# Patient Record
Sex: MALE | Race: WHITE | NOT HISPANIC OR LATINO | Employment: OTHER | URBAN - NONMETROPOLITAN AREA
[De-identification: names, ages, dates, MRNs, and addresses within clinical notes are randomized per-mention and may not be internally consistent; named-entity substitution may affect disease eponyms.]

---

## 2017-06-29 ENCOUNTER — HOSPITAL ENCOUNTER (EMERGENCY)
Facility: HOSPITAL | Age: 67
Discharge: HOME | End: 2017-06-29
Attending: EMERGENCY MEDICINE | Admitting: EMERGENCY MEDICINE

## 2017-06-29 VITALS
WEIGHT: 185 LBS | RESPIRATION RATE: 18 BRPM | SYSTOLIC BLOOD PRESSURE: 117 MMHG | OXYGEN SATURATION: 99 % | DIASTOLIC BLOOD PRESSURE: 69 MMHG | BODY MASS INDEX: 25.06 KG/M2 | HEIGHT: 72 IN | TEMPERATURE: 98.7 F | HEART RATE: 69 BPM
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
## 2017-06-29 PROCEDURE — 99282 EMERGENCY DEPT VISIT SF MDM: CPT

## 2017-06-29 RX ORDER — AMOXICILLIN AND CLAVULANATE POTASSIUM 875; 125 MG/1; MG/1
1 TABLET, FILM COATED ORAL 2 TIMES DAILY
Qty: 20 TABLET | Refills: 0 | Status: SHIPPED | OUTPATIENT
Start: 2017-06-29 | End: 2017-07-09

## 2017-06-29 RX ORDER — ACETAMINOPHEN AND CODEINE PHOSPHATE 300; 30 MG/1; MG/1
1 TABLET ORAL EVERY 6 HOURS PRN
Qty: 12 TABLET | Refills: 0 | Status: SHIPPED | OUTPATIENT
Start: 2017-06-29 | End: 2018-01-04

## 2017-12-14 DIAGNOSIS — Z12.11 SPECIAL SCREENING FOR MALIGNANT NEOPLASMS, COLON: Primary | ICD-10-CM

## 2017-12-18 DIAGNOSIS — Z12.11 SPECIAL SCREENING FOR MALIGNANT NEOPLASMS, COLON: Primary | ICD-10-CM

## 2018-01-05 ENCOUNTER — ANESTHESIA (OUTPATIENT)
Dept: PERIOP | Facility: HOSPITAL | Age: 68
End: 2018-01-05

## 2018-01-05 ENCOUNTER — ANESTHESIA EVENT (OUTPATIENT)
Dept: PERIOP | Facility: HOSPITAL | Age: 68
End: 2018-01-05

## 2018-01-05 ENCOUNTER — HOSPITAL ENCOUNTER (OUTPATIENT)
Facility: HOSPITAL | Age: 68
Setting detail: HOSPITAL OUTPATIENT SURGERY
Discharge: HOME | End: 2018-01-05
Attending: INTERNAL MEDICINE | Admitting: INTERNAL MEDICINE

## 2018-01-05 VITALS
SYSTOLIC BLOOD PRESSURE: 108 MMHG | RESPIRATION RATE: 20 BRPM | WEIGHT: 190 LBS | OXYGEN SATURATION: 99 % | TEMPERATURE: 97.5 F | HEART RATE: 72 BPM | HEIGHT: 72 IN | BODY MASS INDEX: 25.73 KG/M2 | DIASTOLIC BLOOD PRESSURE: 86 MMHG

## 2018-01-05 DIAGNOSIS — Z12.11 SPECIAL SCREENING FOR MALIGNANT NEOPLASMS, COLON: ICD-10-CM

## 2018-01-05 PROCEDURE — 45385 COLONOSCOPY W/LESION REMOVAL: CPT | Performed by: INTERNAL MEDICINE

## 2018-01-05 PROCEDURE — 88305 TISSUE EXAM BY PATHOLOGIST: CPT | Performed by: INTERNAL MEDICINE

## 2018-01-05 PROCEDURE — 25010000002 PROPOFOL 1000 MG/ML EMULSION: Performed by: NURSE ANESTHETIST, CERTIFIED REGISTERED

## 2018-01-05 PROCEDURE — 25010000002 PROPOFOL 10 MG/ML EMULSION: Performed by: NURSE ANESTHETIST, CERTIFIED REGISTERED

## 2018-01-05 PROCEDURE — 25010000002 MIDAZOLAM PER 1 MG: Performed by: NURSE ANESTHETIST, CERTIFIED REGISTERED

## 2018-01-05 PROCEDURE — 25010000002 FENTANYL CITRATE (PF) 100 MCG/2ML SOLUTION: Performed by: NURSE ANESTHETIST, CERTIFIED REGISTERED

## 2018-01-05 RX ORDER — MIDAZOLAM HYDROCHLORIDE 1 MG/ML
INJECTION INTRAMUSCULAR; INTRAVENOUS AS NEEDED
Status: DISCONTINUED | OUTPATIENT
Start: 2018-01-05 | End: 2018-01-05 | Stop reason: SURG

## 2018-01-05 RX ORDER — IPRATROPIUM BROMIDE AND ALBUTEROL SULFATE 2.5; .5 MG/3ML; MG/3ML
3 SOLUTION RESPIRATORY (INHALATION) ONCE AS NEEDED
Status: DISCONTINUED | OUTPATIENT
Start: 2018-01-05 | End: 2018-01-05 | Stop reason: HOSPADM

## 2018-01-05 RX ORDER — PROPOFOL 10 MG/ML
VIAL (ML) INTRAVENOUS AS NEEDED
Status: DISCONTINUED | OUTPATIENT
Start: 2018-01-05 | End: 2018-01-05 | Stop reason: SURG

## 2018-01-05 RX ORDER — ONDANSETRON 2 MG/ML
4 INJECTION INTRAMUSCULAR; INTRAVENOUS ONCE AS NEEDED
Status: DISCONTINUED | OUTPATIENT
Start: 2018-01-05 | End: 2018-01-05 | Stop reason: HOSPADM

## 2018-01-05 RX ORDER — SIMVASTATIN 10 MG
10 TABLET ORAL NIGHTLY
COMMUNITY
End: 2021-05-03 | Stop reason: SDUPTHER

## 2018-01-05 RX ORDER — FLUOXETINE HYDROCHLORIDE 20 MG/1
20 CAPSULE ORAL DAILY
COMMUNITY

## 2018-01-05 RX ORDER — FENTANYL CITRATE 50 UG/ML
INJECTION, SOLUTION INTRAMUSCULAR; INTRAVENOUS AS NEEDED
Status: DISCONTINUED | OUTPATIENT
Start: 2018-01-05 | End: 2018-01-05 | Stop reason: SURG

## 2018-01-05 RX ORDER — CLOPIDOGREL BISULFATE 75 MG/1
75 TABLET ORAL DAILY
COMMUNITY
End: 2019-11-06

## 2018-01-05 RX ORDER — SODIUM CHLORIDE 0.9 % (FLUSH) 0.9 %
1-10 SYRINGE (ML) INJECTION AS NEEDED
Status: DISCONTINUED | OUTPATIENT
Start: 2018-01-05 | End: 2018-01-05 | Stop reason: HOSPADM

## 2018-01-05 RX ORDER — SODIUM CHLORIDE, SODIUM LACTATE, POTASSIUM CHLORIDE, CALCIUM CHLORIDE 600; 310; 30; 20 MG/100ML; MG/100ML; MG/100ML; MG/100ML
125 INJECTION, SOLUTION INTRAVENOUS CONTINUOUS
Status: DISCONTINUED | OUTPATIENT
Start: 2018-01-05 | End: 2018-01-05 | Stop reason: HOSPADM

## 2018-01-05 RX ADMIN — MIDAZOLAM HYDROCHLORIDE 2 MG: 1 INJECTION, SOLUTION INTRAMUSCULAR; INTRAVENOUS at 11:44

## 2018-01-05 RX ADMIN — SODIUM CHLORIDE, POTASSIUM CHLORIDE, SODIUM LACTATE AND CALCIUM CHLORIDE: 600; 310; 30; 20 INJECTION, SOLUTION INTRAVENOUS at 11:44

## 2018-01-05 RX ADMIN — FENTANYL CITRATE 100 MCG: 50 INJECTION INTRAMUSCULAR; INTRAVENOUS at 11:44

## 2018-01-05 RX ADMIN — PROPOFOL 120 MCG/KG/MIN: 10 INJECTION, EMULSION INTRAVENOUS at 11:47

## 2018-01-05 RX ADMIN — PROPOFOL 80 MG: 10 INJECTION, EMULSION INTRAVENOUS at 11:47

## 2018-01-05 NOTE — PLAN OF CARE
Problem: GI Endoscopy (Adult)  Goal: Signs and Symptoms of Listed Potential Problems Will be Absent or Manageable (GI Endoscopy)  Outcome: Ongoing (interventions implemented as appropriate)   01/05/18 1150   GI Endoscopy   Problems Assessed (GI Endoscopy) all   Problems Present (GI Endoscopy) none

## 2018-01-05 NOTE — H&P
"History and physical examination  January 5, 2018    HPI  67-year-old white male presents for screening colonoscopy.  No prior colonoscopy.  Family history negative for colon cancer.      Review of Systems   Negative and noncontributory.    ACTIVE PROBLEMS:   Specialty Problems     None          PAST MEDICAL HISTORY:  Past Medical History:   Diagnosis Date   • Cancer    • Hypertension    • Stroke        SURGICAL HISTORY:  Past Surgical History:   Procedure Laterality Date   • SHOULDER ROTATOR CUFF REPAIR Left        FAMILY HISTORY:  History reviewed. No pertinent family history.    SOCIAL HISTORY:  Social History   Substance Use Topics   • Smoking status: Former Smoker     Packs/day: 1.00     Years: 40.00   • Smokeless tobacco: Never Used   • Alcohol use No       CURRENT MEDICATION:    Current Facility-Administered Medications:   •  lactated ringers infusion, 125 mL/hr, Intravenous, Continuous, Chava Hickman MD  •  sodium chloride 0.9 % flush 1-10 mL, 1-10 mL, Intravenous, PRN, Chava Hickman MD     I have reviewed his list of current medications.    ALLERGIES:  Review of patient's allergies indicates no known allergies.    VISIT VITALS:  /79 (BP Location: Right arm, Patient Position: Sitting)  Pulse 65  Temp 97.4 °F (36.3 °C) (Oral)   Resp 20  Ht 182.9 cm (72\")  Wt 86.2 kg (190 lb)  SpO2 99%  BMI 25.77 kg/m2    PHYSICAL EXAMINATION:  Physical Exam   Alert, oriented ×3  HEENT: Normal  Neck: No mass  Chest: Clear  Heart: Regular rhythm  Abdomen: Soft, nontender, active BS  Extremities: No edema  Neuro: No focal deficit    Assessment/Plan   Colon cancer screening    REC  Screening colonoscopy is planned.  Procedure, benefits, risks and alternatives were explained to the patient.         Neeraj Monahan III, MD  "

## 2018-01-05 NOTE — ANESTHESIA POSTPROCEDURE EVALUATION
Patient: Jc Graves    Procedure Summary     Date Anesthesia Start Anesthesia Stop Room / Location    01/05/18 1144 1206 [facility] / [facility]       Procedure Diagnosis Surgeon Provider    COLONOSCOPY FOR SCREENING (N/A ) Special screening for malignant neoplasms, colon  (Special screening for malignant neoplasms, colon [Z12.11]) MD Chava Childers III, MD          Anesthesia Type: general  Last vitals  BP   (!) 79/48 (01/05/18 1212)   Temp   97.5 °F (36.4 °C) (01/05/18 1207)   Pulse   67 (01/05/18 1212)   Resp   20 (01/05/18 1212)     SpO2   99 % (01/05/18 1212)     Post Anesthesia Care and Evaluation    Patient location during evaluation: PHASE II  Patient participation: complete - patient participated  Level of consciousness: awake and alert  Pain score: 1  Pain management: adequate  Airway patency: patent  Anesthetic complications: No anesthetic complications  PONV Status: controlled  Cardiovascular status: acceptable  Respiratory status: acceptable  Hydration status: acceptable

## 2018-01-05 NOTE — PLAN OF CARE
Problem: GI Endoscopy (Adult)  Goal: Signs and Symptoms of Listed Potential Problems Will be Absent or Manageable (GI Endoscopy)  Outcome: Ongoing (interventions implemented as appropriate)   01/05/18 1100   GI Endoscopy   Problems Assessed (GI Endoscopy) all   Problems Present (GI Endoscopy) none

## 2018-01-05 NOTE — ANESTHESIA PREPROCEDURE EVALUATION
Anesthesia Evaluation     Patient summary reviewed and Nursing notes reviewed   no history of anesthetic complications:  NPO Solid Status: > 8 hours  NPO Liquid Status: > 8 hours     Airway   Mallampati: II  TM distance: >3 FB  Neck ROM: full  no difficulty expected  Dental - normal exam   (+) edentulous, lower dentures and upper dentures    Pulmonary - normal exam   (+) a smoker Former, COPD,   (-) asthma  Cardiovascular - normal exam  Exercise tolerance: good (4-7 METS)    NYHA Classification: II  PT is on anticoagulation therapy    (+) hypertension,   (-) past MI, dysrhythmias, angina, CHF, hyperlipidemia      Neuro/Psych  (+) CVA (2011), psychiatric history Depression,     (-) seizures  GI/Hepatic/Renal/Endo - negative ROS   (-) GERD, liver disease, no renal disease, diabetes, hypothyroidism    Musculoskeletal (-) negative ROS    Abdominal  - normal exam    Bowel sounds: normal.   Substance History - negative use     OB/GYN negative ob/gyn ROS         Other      history of cancer    (-) arthritis                                          Anesthesia Plan    ASA 3     general     intravenous induction   Anesthetic plan and risks discussed with spouse/significant other and patient.  Use of blood products discussed with patient and spouse/significant other  Consented to blood products.

## 2018-01-05 NOTE — OP NOTE
01/05/18    COLONOSCOPY FOR SCREENING with polypectomy Procedure Note    Jc Graves  1/5/2018    Pre-op Diagnosis: Colon cancer screening, no prior colonoscopy      Post-op Diagnosis:   -Rectal polyp, removed  -Diverticulosis, sigmoid  -Hemorrhoids, external/internal       Anesthesia: Per Anesthesia service, general anesthesia      Estimated Blood Loss: Negligible      Findings: Rectal examination revealed no mass.  External hemorrhoids were noted.  Colonoscopy was performed to the cecum, confirmed by identification of typical cecal anatomy.  Bowel preparation was good.  The scope was retroflexed within the rectum.  All areas were examined carefully.  Internal hemorrhoids were noted.  A single small (under 1 cm in size) benign-appearing sessile polyp was found in the rectum and this was removed using the cold snare.  Diverticulosis was present in the sigmoid colon without evidence of diverticulitis.  No other abnormality was identified.  He tolerated the procedure well and there were no complications.  He left the OR in stable and satisfactory condition.      Complications: None      Recommendations:   Findings discussed with the patient  Await biopsy findings  Repeat screening/surveillance colonoscopy in about 5 years      Neeraj Monahan III, MD     Date: 1/5/2018  Time: 12:04 PM

## 2018-01-08 LAB
LAB AP CASE REPORT: NORMAL
Lab: NORMAL
PATH REPORT.FINAL DX SPEC: NORMAL

## 2019-11-06 ENCOUNTER — HOSPITAL ENCOUNTER (EMERGENCY)
Facility: HOSPITAL | Age: 69
Discharge: HOME | End: 2019-11-06
Attending: EMERGENCY MEDICINE | Admitting: EMERGENCY MEDICINE

## 2019-11-06 ENCOUNTER — APPOINTMENT (OUTPATIENT)
Dept: GENERAL RADIOLOGY | Facility: HOSPITAL | Age: 69
End: 2019-11-06

## 2019-11-06 VITALS
SYSTOLIC BLOOD PRESSURE: 141 MMHG | OXYGEN SATURATION: 100 % | DIASTOLIC BLOOD PRESSURE: 91 MMHG | RESPIRATION RATE: 18 BRPM | HEART RATE: 61 BPM | WEIGHT: 195 LBS | HEIGHT: 72 IN | TEMPERATURE: 97.7 F | BODY MASS INDEX: 26.41 KG/M2

## 2019-11-06 DIAGNOSIS — I48.91 ATRIAL FIBRILLATION, UNSPECIFIED TYPE (HCC): Primary | ICD-10-CM

## 2019-11-06 LAB
ALBUMIN SERPL-MCNC: 4.41 G/DL (ref 3.5–5.2)
ALBUMIN/GLOB SERPL: 1.4 G/DL
ALP SERPL-CCNC: 92 U/L (ref 39–117)
ALT SERPL W P-5'-P-CCNC: 15 U/L (ref 1–41)
ANION GAP SERPL CALCULATED.3IONS-SCNC: 12.8 MMOL/L (ref 5–15)
AST SERPL-CCNC: 24 U/L (ref 1–40)
BASOPHILS # BLD AUTO: 0.04 10*3/MM3 (ref 0–0.2)
BASOPHILS NFR BLD AUTO: 1 % (ref 0–1.5)
BILIRUB SERPL-MCNC: 0.8 MG/DL (ref 0.2–1.2)
BUN BLD-MCNC: 17 MG/DL (ref 8–23)
BUN/CREAT SERPL: 10.7 (ref 7–25)
CALCIUM SPEC-SCNC: 10 MG/DL (ref 8.6–10.5)
CHLORIDE SERPL-SCNC: 100 MMOL/L (ref 98–107)
CO2 SERPL-SCNC: 29.2 MMOL/L (ref 22–29)
CREAT BLD-MCNC: 1.59 MG/DL (ref 0.76–1.27)
DEPRECATED RDW RBC AUTO: 46.2 FL (ref 37–54)
EOSINOPHIL # BLD AUTO: 0.21 10*3/MM3 (ref 0–0.4)
EOSINOPHIL NFR BLD AUTO: 5.5 % (ref 0.3–6.2)
ERYTHROCYTE [DISTWIDTH] IN BLOOD BY AUTOMATED COUNT: 13.6 % (ref 12.3–15.4)
GFR SERPL CREATININE-BSD FRML MDRD: 44 ML/MIN/1.73
GLOBULIN UR ELPH-MCNC: 3.1 GM/DL
GLUCOSE BLD-MCNC: 86 MG/DL (ref 65–99)
HCT VFR BLD AUTO: 37.2 % (ref 37.5–51)
HGB BLD-MCNC: 12.2 G/DL (ref 13–17.7)
HOLD SPECIMEN: NORMAL
HOLD SPECIMEN: NORMAL
IMM GRANULOCYTES # BLD AUTO: 0.01 10*3/MM3 (ref 0–0.05)
IMM GRANULOCYTES NFR BLD AUTO: 0.3 % (ref 0–0.5)
LYMPHOCYTES # BLD AUTO: 1.46 10*3/MM3 (ref 0.7–3.1)
LYMPHOCYTES NFR BLD AUTO: 38.1 % (ref 19.6–45.3)
MAGNESIUM SERPL-MCNC: 2.2 MG/DL (ref 1.6–2.4)
MCH RBC QN AUTO: 30.4 PG (ref 26.6–33)
MCHC RBC AUTO-ENTMCNC: 32.8 G/DL (ref 31.5–35.7)
MCV RBC AUTO: 92.8 FL (ref 79–97)
MONOCYTES # BLD AUTO: 0.3 10*3/MM3 (ref 0.1–0.9)
MONOCYTES NFR BLD AUTO: 7.8 % (ref 5–12)
NEUTROPHILS # BLD AUTO: 1.81 10*3/MM3 (ref 1.7–7)
NEUTROPHILS NFR BLD AUTO: 47.3 % (ref 42.7–76)
NRBC BLD AUTO-RTO: 0 /100 WBC (ref 0–0.2)
PLATELET # BLD AUTO: 170 10*3/MM3 (ref 140–450)
PMV BLD AUTO: 9.6 FL (ref 6–12)
POTASSIUM BLD-SCNC: 4.7 MMOL/L (ref 3.5–5.2)
PROT SERPL-MCNC: 7.5 G/DL (ref 6–8.5)
RBC # BLD AUTO: 4.01 10*6/MM3 (ref 4.14–5.8)
SODIUM BLD-SCNC: 142 MMOL/L (ref 136–145)
T4 FREE SERPL-MCNC: 1.34 NG/DL (ref 0.93–1.7)
TROPONIN T SERPL-MCNC: <0.01 NG/ML (ref 0–0.03)
TSH SERPL DL<=0.05 MIU/L-ACNC: 1.77 UIU/ML (ref 0.27–4.2)
WBC NRBC COR # BLD: 3.83 10*3/MM3 (ref 3.4–10.8)
WHOLE BLOOD HOLD SPECIMEN: NORMAL
WHOLE BLOOD HOLD SPECIMEN: NORMAL

## 2019-11-06 PROCEDURE — 99285 EMERGENCY DEPT VISIT HI MDM: CPT

## 2019-11-06 PROCEDURE — 83735 ASSAY OF MAGNESIUM: CPT | Performed by: EMERGENCY MEDICINE

## 2019-11-06 PROCEDURE — 84439 ASSAY OF FREE THYROXINE: CPT | Performed by: EMERGENCY MEDICINE

## 2019-11-06 PROCEDURE — 85025 COMPLETE CBC W/AUTO DIFF WBC: CPT | Performed by: EMERGENCY MEDICINE

## 2019-11-06 PROCEDURE — 80053 COMPREHEN METABOLIC PANEL: CPT | Performed by: EMERGENCY MEDICINE

## 2019-11-06 PROCEDURE — 71045 X-RAY EXAM CHEST 1 VIEW: CPT | Performed by: RADIOLOGY

## 2019-11-06 PROCEDURE — 84443 ASSAY THYROID STIM HORMONE: CPT | Performed by: EMERGENCY MEDICINE

## 2019-11-06 PROCEDURE — 93005 ELECTROCARDIOGRAM TRACING: CPT | Performed by: EMERGENCY MEDICINE

## 2019-11-06 PROCEDURE — 71045 X-RAY EXAM CHEST 1 VIEW: CPT

## 2019-11-06 PROCEDURE — 84484 ASSAY OF TROPONIN QUANT: CPT | Performed by: EMERGENCY MEDICINE

## 2019-11-06 RX ORDER — SODIUM CHLORIDE 0.9 % (FLUSH) 0.9 %
10 SYRINGE (ML) INJECTION AS NEEDED
Status: DISCONTINUED | OUTPATIENT
Start: 2019-11-06 | End: 2019-11-06 | Stop reason: HOSPADM

## 2019-11-06 RX ADMIN — APIXABAN 5 MG: 5 TABLET, FILM COATED ORAL at 17:34

## 2019-11-06 NOTE — DISCHARGE INSTRUCTIONS
Home in care of wife.  Eliquis 5 mg twice daily, in place of Plavix.  Continue your other medications.  See Dr. Gallo in the [place] office Friday morning at 10 AM.  Return to the emergency department immediately if any problems.

## 2019-11-06 NOTE — ED PROVIDER NOTES
"Subjective   Patient is a 68-year-old male who states that he has been completely asymptomatic, feeling completely well, states he has been \"feeling great\", has been very active, has been working outdoors, has had no symptoms of any kind..  He denies any symptoms or illness of any kind.  He went to his PCPs office today for routine visit, medication refill, was found to be in atrial fibrillation and was sent to the ED.  Patient denies any known history of atrial fibrillation.  He does have a history of hypertension, dyslipidemia, reports that he had a stroke 6 years ago that did not leave him with any permanent deficits.  He denies any symptoms of any kind.            Review of Systems   Constitutional: Negative for chills, diaphoresis and fever.   HENT: Negative for ear pain, sore throat and trouble swallowing.    Eyes: Negative for photophobia and pain.   Respiratory: Negative for shortness of breath, wheezing and stridor.    Cardiovascular: Negative for chest pain, palpitations and leg swelling.   Gastrointestinal: Negative for abdominal distention, abdominal pain, blood in stool, diarrhea, nausea and vomiting.   Endocrine: Negative for polydipsia and polyphagia.   Genitourinary: Negative for difficulty urinating and flank pain.   Musculoskeletal: Negative for back pain, neck pain and neck stiffness.   Skin: Negative for color change and pallor.   Neurological: Negative for dizziness, tremors, seizures, syncope, facial asymmetry, speech difficulty, weakness, light-headedness, numbness and headaches.   Hematological: Does not bruise/bleed easily.   Psychiatric/Behavioral: Negative for confusion.   All other systems reviewed and are negative.      Past Medical History:   Diagnosis Date   • Cancer (CMS/HCC)    • Hypertension    • Stroke (CMS/HCC)        No Known Allergies    Past Surgical History:   Procedure Laterality Date   • COLONOSCOPY N/A 1/5/2018    Procedure: COLONOSCOPY FOR SCREENING;  Surgeon: Neeraj " Xiang Monahan III, MD;  Location: [facility] OR;  Service:    • SHOULDER ROTATOR CUFF REPAIR Left        History reviewed. No pertinent family history.    Social History     Socioeconomic History   • Marital status:      Spouse name: Not on file   • Number of children: Not on file   • Years of education: Not on file   • Highest education level: Not on file   Tobacco Use   • Smoking status: Former Smoker     Packs/day: 1.00     Years: 40.00     Pack years: 40.00   • Smokeless tobacco: Never Used   Substance and Sexual Activity   • Alcohol use: No   • Drug use: No   • Sexual activity: Defer           Objective   Physical Exam   Constitutional: He is oriented to person, place, and time. He appears well-developed and well-nourished. No distress.   Very pleasant white male, appears comfortable, appears well.   HENT:   Head: Normocephalic and atraumatic.   Eyes: EOM are normal. Pupils are equal, round, and reactive to light. No scleral icterus.   Neck: Normal range of motion. Neck supple. No neck rigidity. No tracheal deviation present.   Cardiovascular: Normal rate and intact distal pulses.   Irregularly irregular   Pulmonary/Chest: Effort normal and breath sounds normal. No respiratory distress. He exhibits no tenderness.   Abdominal: Soft. Bowel sounds are normal. There is no tenderness. There is no rebound and no guarding.   Musculoskeletal: Normal range of motion. He exhibits no tenderness.   Neurological: He is alert and oriented to person, place, and time. He has normal strength. No cranial nerve deficit or sensory deficit. He exhibits normal muscle tone. Coordination normal. GCS eye subscore is 4. GCS verbal subscore is 5. GCS motor subscore is 6.   Skin: Skin is warm and dry. Capillary refill takes less than 2 seconds. He is not diaphoretic. No cyanosis. No pallor.   Psychiatric: He has a normal mood and affect. His behavior is normal.   Nursing note and vitals reviewed.      Procedures  EKG shows atrial  fibrillation with ventricular rate of 68.  No apparent acute ischemia.  XR Chest 1 View   Final Result   No evidence of active or acute cardiopulmonary disease on today's chest   radiograph.       This report was finalized on 11/6/2019 4:42 PM by Dr. Martínez Erickson MD.            Results for orders placed or performed during the hospital encounter of 11/06/19   Comprehensive Metabolic Panel   Result Value Ref Range    Glucose 86 65 - 99 mg/dL    BUN 17 8 - 23 mg/dL    Creatinine 1.59 (H) 0.76 - 1.27 mg/dL    Sodium 142 136 - 145 mmol/L    Potassium 4.7 3.5 - 5.2 mmol/L    Chloride 100 98 - 107 mmol/L    CO2 29.2 (H) 22.0 - 29.0 mmol/L    Calcium 10.0 8.6 - 10.5 mg/dL    Total Protein 7.5 6.0 - 8.5 g/dL    Albumin 4.41 3.50 - 5.20 g/dL    ALT (SGPT) 15 1 - 41 U/L    AST (SGOT) 24 1 - 40 U/L    Alkaline Phosphatase 92 39 - 117 U/L    Total Bilirubin 0.8 0.2 - 1.2 mg/dL    eGFR Non African Amer 44 (L) >60 mL/min/1.73    Globulin 3.1 gm/dL    A/G Ratio 1.4 g/dL    BUN/Creatinine Ratio 10.7 7.0 - 25.0    Anion Gap 12.8 5.0 - 15.0 mmol/L   Troponin   Result Value Ref Range    Troponin T <0.010 0.000 - 0.030 ng/mL   T4, Free   Result Value Ref Range    Free T4 1.34 0.93 - 1.70 ng/dL   TSH   Result Value Ref Range    TSH 1.770 0.270 - 4.200 uIU/mL   Magnesium   Result Value Ref Range    Magnesium 2.2 1.6 - 2.4 mg/dL   CBC Auto Differential   Result Value Ref Range    WBC 3.83 3.40 - 10.80 10*3/mm3    RBC 4.01 (L) 4.14 - 5.80 10*6/mm3    Hemoglobin 12.2 (L) 13.0 - 17.7 g/dL    Hematocrit 37.2 (L) 37.5 - 51.0 %    MCV 92.8 79.0 - 97.0 fL    MCH 30.4 26.6 - 33.0 pg    MCHC 32.8 31.5 - 35.7 g/dL    RDW 13.6 12.3 - 15.4 %    RDW-SD 46.2 37.0 - 54.0 fl    MPV 9.6 6.0 - 12.0 fL    Platelets 170 140 - 450 10*3/mm3    Neutrophil % 47.3 42.7 - 76.0 %    Lymphocyte % 38.1 19.6 - 45.3 %    Monocyte % 7.8 5.0 - 12.0 %    Eosinophil % 5.5 0.3 - 6.2 %    Basophil % 1.0 0.0 - 1.5 %    Immature Grans % 0.3 0.0 - 0.5 %    Neutrophils,  Absolute 1.81 1.70 - 7.00 10*3/mm3    Lymphocytes, Absolute 1.46 0.70 - 3.10 10*3/mm3    Monocytes, Absolute 0.30 0.10 - 0.90 10*3/mm3    Eosinophils, Absolute 0.21 0.00 - 0.40 10*3/mm3    Basophils, Absolute 0.04 0.00 - 0.20 10*3/mm3    Immature Grans, Absolute 0.01 0.00 - 0.05 10*3/mm3    nRBC 0.0 0.0 - 0.2 /100 WBC   Light Blue Top   Result Value Ref Range    Extra Tube hold for add-on    Green Top (Gel)   Result Value Ref Range    Extra Tube Hold for add-ons.    Lavender Top   Result Value Ref Range    Extra Tube hold for add-on    Gold Top - SST   Result Value Ref Range    Extra Tube Hold for add-ons.                 ED Course  ED Course as of Nov 06 1813 Wed Nov 06, 2019   1703 Case discussed with Dr. Feliz.  He advises that he is happy to admit the patient under his service.  He wants me to consult with cardiology.  I discussed the case with Dr. Gallo.  He advises that the patient does not need to be admitted to the hospital.  He advises me to start him on Eliquis 5 mg twice daily, have him come to the [place] office Friday morning at 10 AM.  I discussed this with the patient and his wife.  The patient does not want to be admitted to the hospital, he wants to go home.  They voice understanding and agreement with this plan.  Patient agrees that he will return to the emergency department immediately if he develops any symptoms or has any problems.  [CM]      ED Course User Index  [CM] Herrera Santana MD                  [facility]    Final diagnoses:   Atrial fibrillation, unspecified type (CMS/[facility])               Please note that portions of this note were completed with a voice recognition program.        Herrera Santana MD  11/06/19 1814

## 2019-11-06 NOTE — ED NOTES
DR JOSEPH AT BEDSIDE, SPEAKING TO PT AT THIS TIME, PT IS NOT BEING ADMITTED AT THIS TIME     Octavia Elizabeth, LOCO  11/06/19 8086

## 2019-11-06 NOTE — ED NOTES
Pt requests to hold the iv and oxygen at this time, pulse ox is 100% r/a, pt states he went to dr for a yearly check up and med refills that he is having no symptoms and feels fine and was only here due to ekg, spoke to dr cedeño, dr cedeño aware, pt reading magazine and visiting with female  at bedside     Octavia Elizabeth, RN  11/06/19 7981

## 2019-11-07 NOTE — ED NOTES
Pt left er ambualtory with wife, without any difficulty, no complaints voiced, pt alert, oriented, instr. To return to er for any changes, otherwise to follow up with md as directed, pt verb understanding     Octavia Elizabeth RN  11/06/19 3800

## 2021-03-08 ENCOUNTER — TRANSCRIBE ORDERS (OUTPATIENT)
Dept: ADMINISTRATIVE | Facility: HOSPITAL | Age: 71
End: 2021-03-08

## 2021-03-08 ENCOUNTER — LAB (OUTPATIENT)
Dept: LAB | Facility: HOSPITAL | Age: 71
End: 2021-03-08

## 2021-03-08 DIAGNOSIS — L30.9 ACUTE DERMATITIS: Primary | ICD-10-CM

## 2021-03-08 DIAGNOSIS — I10 ESSENTIAL HYPERTENSION, BENIGN: ICD-10-CM

## 2021-03-08 DIAGNOSIS — I10 ESSENTIAL HYPERTENSION, BENIGN: Primary | ICD-10-CM

## 2021-03-08 DIAGNOSIS — L30.9 ACUTE DERMATITIS: ICD-10-CM

## 2021-03-08 LAB
ALBUMIN SERPL-MCNC: 4.22 G/DL (ref 3.5–5.2)
ALBUMIN/GLOB SERPL: 1.2 G/DL
ALP SERPL-CCNC: 91 U/L (ref 39–117)
ALT SERPL W P-5'-P-CCNC: 19 U/L (ref 1–41)
ANION GAP SERPL CALCULATED.3IONS-SCNC: 8.7 MMOL/L (ref 5–15)
AST SERPL-CCNC: 29 U/L (ref 1–40)
BASOPHILS # BLD AUTO: 0.05 10*3/MM3 (ref 0–0.2)
BASOPHILS NFR BLD AUTO: 1.4 % (ref 0–1.5)
BILIRUB SERPL-MCNC: 0.8 MG/DL (ref 0–1.2)
BUN SERPL-MCNC: 24 MG/DL (ref 8–23)
BUN/CREAT SERPL: 16.6 (ref 7–25)
CALCIUM SPEC-SCNC: 10 MG/DL (ref 8.6–10.5)
CHLORIDE SERPL-SCNC: 97 MMOL/L (ref 98–107)
CO2 SERPL-SCNC: 28.3 MMOL/L (ref 22–29)
CREAT SERPL-MCNC: 1.45 MG/DL (ref 0.76–1.27)
DEPRECATED RDW RBC AUTO: 43.8 FL (ref 37–54)
EOSINOPHIL # BLD AUTO: 0.13 10*3/MM3 (ref 0–0.4)
EOSINOPHIL NFR BLD AUTO: 3.7 % (ref 0.3–6.2)
ERYTHROCYTE [DISTWIDTH] IN BLOOD BY AUTOMATED COUNT: 13.4 % (ref 12.3–15.4)
GFR SERPL CREATININE-BSD FRML MDRD: 48 ML/MIN/1.73
GLOBULIN UR ELPH-MCNC: 3.5 GM/DL
GLUCOSE SERPL-MCNC: 92 MG/DL (ref 65–99)
HCT VFR BLD AUTO: 36.4 % (ref 37.5–51)
HGB BLD-MCNC: 12.1 G/DL (ref 13–17.7)
IMM GRANULOCYTES # BLD AUTO: 0.02 10*3/MM3 (ref 0–0.05)
IMM GRANULOCYTES NFR BLD AUTO: 0.6 % (ref 0–0.5)
LYMPHOCYTES # BLD AUTO: 1.2 10*3/MM3 (ref 0.7–3.1)
LYMPHOCYTES NFR BLD AUTO: 33.9 % (ref 19.6–45.3)
MCH RBC QN AUTO: 29.5 PG (ref 26.6–33)
MCHC RBC AUTO-ENTMCNC: 33.2 G/DL (ref 31.5–35.7)
MCV RBC AUTO: 88.8 FL (ref 79–97)
MONOCYTES # BLD AUTO: 0.37 10*3/MM3 (ref 0.1–0.9)
MONOCYTES NFR BLD AUTO: 10.5 % (ref 5–12)
NEUTROPHILS NFR BLD AUTO: 1.77 10*3/MM3 (ref 1.7–7)
NEUTROPHILS NFR BLD AUTO: 49.9 % (ref 42.7–76)
NRBC BLD AUTO-RTO: 0 /100 WBC (ref 0–0.2)
PLATELET # BLD AUTO: 198 10*3/MM3 (ref 140–450)
PMV BLD AUTO: 9.4 FL (ref 6–12)
POTASSIUM SERPL-SCNC: 4.6 MMOL/L (ref 3.5–5.2)
PROT SERPL-MCNC: 7.7 G/DL (ref 6–8.5)
RBC # BLD AUTO: 4.1 10*6/MM3 (ref 4.14–5.8)
SODIUM SERPL-SCNC: 134 MMOL/L (ref 136–145)
TSH SERPL DL<=0.05 MIU/L-ACNC: 1.82 UIU/ML (ref 0.27–4.2)
WBC # BLD AUTO: 3.54 10*3/MM3 (ref 3.4–10.8)

## 2021-03-08 PROCEDURE — 82607 VITAMIN B-12: CPT

## 2021-03-08 PROCEDURE — 82306 VITAMIN D 25 HYDROXY: CPT

## 2021-03-08 PROCEDURE — 36415 COLL VENOUS BLD VENIPUNCTURE: CPT

## 2021-03-08 PROCEDURE — 85025 COMPLETE CBC W/AUTO DIFF WBC: CPT

## 2021-03-08 PROCEDURE — 84443 ASSAY THYROID STIM HORMONE: CPT

## 2021-03-08 PROCEDURE — 80053 COMPREHEN METABOLIC PANEL: CPT

## 2021-03-08 PROCEDURE — 82746 ASSAY OF FOLIC ACID SERUM: CPT

## 2021-03-09 LAB
25(OH)D3 SERPL-MCNC: 45.1 NG/ML (ref 30–100)
FOLATE SERPL-MCNC: 15.2 NG/ML (ref 4.78–24.2)
VIT B12 BLD-MCNC: 308 PG/ML (ref 211–946)

## 2021-03-16 ENCOUNTER — IMMUNIZATION (OUTPATIENT)
Dept: VACCINE CLINIC | Facility: HOSPITAL | Age: 71
End: 2021-03-16

## 2021-03-16 PROCEDURE — 91300 HC SARSCOV02 VAC 30MCG/0.3ML IM: CPT | Performed by: INTERNAL MEDICINE

## 2021-03-16 PROCEDURE — 0001A: CPT | Performed by: INTERNAL MEDICINE

## 2021-04-06 ENCOUNTER — IMMUNIZATION (OUTPATIENT)
Dept: VACCINE CLINIC | Facility: HOSPITAL | Age: 71
End: 2021-04-06

## 2021-04-06 PROCEDURE — 91300 HC SARSCOV02 VAC 30MCG/0.3ML IM: CPT | Performed by: INTERNAL MEDICINE

## 2021-04-06 PROCEDURE — 0002A: CPT | Performed by: INTERNAL MEDICINE

## 2021-05-03 ENCOUNTER — OFFICE VISIT (OUTPATIENT)
Dept: UROLOGY | Facility: CLINIC | Age: 71
End: 2021-05-03

## 2021-05-03 VITALS — WEIGHT: 196 LBS | HEIGHT: 72 IN | BODY MASS INDEX: 26.55 KG/M2 | TEMPERATURE: 97.5 F

## 2021-05-03 DIAGNOSIS — N30.01 ACUTE CYSTITIS WITH HEMATURIA: ICD-10-CM

## 2021-05-03 DIAGNOSIS — R31.0 GROSS HEMATURIA: ICD-10-CM

## 2021-05-03 DIAGNOSIS — R35.0 FREQUENCY OF MICTURITION: Primary | ICD-10-CM

## 2021-05-03 LAB
BILIRUB BLD-MCNC: NEGATIVE MG/DL
CLARITY, POC: ABNORMAL
COLOR UR: YELLOW
GLUCOSE UR STRIP-MCNC: NEGATIVE MG/DL
KETONES UR QL: NEGATIVE
LEUKOCYTE EST, POC: ABNORMAL
NITRITE UR-MCNC: NEGATIVE MG/ML
PH UR: 5.5 [PH] (ref 5–8)
PROT UR STRIP-MCNC: ABNORMAL MG/DL
RBC # UR STRIP: ABNORMAL /UL
SP GR UR: 1.02 (ref 1–1.03)
UROBILINOGEN UR QL: NORMAL

## 2021-05-03 PROCEDURE — 87086 URINE CULTURE/COLONY COUNT: CPT | Performed by: UROLOGY

## 2021-05-03 PROCEDURE — 81003 URINALYSIS AUTO W/O SCOPE: CPT | Performed by: UROLOGY

## 2021-05-03 PROCEDURE — 99204 OFFICE O/P NEW MOD 45 MIN: CPT | Performed by: UROLOGY

## 2021-05-03 RX ORDER — SIMVASTATIN 20 MG
1 TABLET ORAL NIGHTLY
COMMUNITY
Start: 2021-04-29

## 2021-05-03 RX ORDER — AMLODIPINE BESYLATE 5 MG/1
1 TABLET ORAL DAILY
COMMUNITY
Start: 2013-05-09 | End: 2021-05-03 | Stop reason: CLARIF

## 2021-05-03 RX ORDER — TRIAMCINOLONE ACETONIDE 1 MG/G
CREAM TOPICAL TAKE AS DIRECTED
COMMUNITY
Start: 2021-03-09 | End: 2021-05-03

## 2021-05-03 RX ORDER — UREA 40 %
CREAM (GRAM) TOPICAL 2 TIMES DAILY
COMMUNITY
Start: 2021-03-27 | End: 2021-05-03

## 2021-05-03 RX ORDER — LOSARTAN POTASSIUM AND HYDROCHLOROTHIAZIDE 25; 100 MG/1; MG/1
1 TABLET ORAL DAILY
COMMUNITY
Start: 2021-02-10

## 2021-05-03 RX ORDER — SULFAMETHOXAZOLE AND TRIMETHOPRIM 800; 160 MG/1; MG/1
1 TABLET ORAL 2 TIMES DAILY
COMMUNITY
Start: 2021-04-29 | End: 2021-09-28

## 2021-05-03 NOTE — PROGRESS NOTES
Chief Complaint:          Chief Complaint   Patient presents with   • Blood in Urine     New pt       HPI:   70 y.o. male.  Referred for evaluation of gross hematuria.  He has a positive urinalysis today for leukocytes.  He been on antibiotics previously.  He is on atrial fibrillation with Eliquis.  He has unable to give a urine specimen today.  He denies tobacco.  He brought information from [employer] indicating no other significant problems.  I Socorro get an upper and lower tract investigation in view of the absence of tobacco gross hematuria.  I am also going to empirically treat him on for urinary tract infection    Past Medical History:        Past Medical History:   Diagnosis Date   • Cancer (CMS/HCC)    • Hypertension    • Stroke (CMS/HCC)          Current Meds:     Current Outpatient Medications   Medication Sig Dispense Refill   • apixaban (ELIQUIS) 5 MG tablet tablet Take 1 tablet by mouth 2 (Two) Times a Day. 60 tablet 0   • FLUoxetine (PROzac) 20 MG capsule Take 20 mg by mouth Daily.     • losartan-hydrochlorothiazide (HYZAAR) 100-25 MG per tablet Take 1 tablet by mouth Daily.     • simvastatin (ZOCOR) 20 MG tablet Take 1 tablet by mouth Every Night.     • sulfamethoxazole-trimethoprim (BACTRIM DS,SEPTRA DS) 800-160 MG per tablet Take 1 tablet by mouth 2 (two) times a day.       No current facility-administered medications for this visit.        Allergies:      No Known Allergies     Past Surgical History:     Past Surgical History:   Procedure Laterality Date   • COLONOSCOPY N/A 1/5/2018    Procedure: COLONOSCOPY FOR SCREENING;  Surgeon: Neeraj Monahan III, MD;  Location: [facility];  Service:    • SHOULDER ROTATOR CUFF REPAIR Left          Social History:     Social History     Socioeconomic History   • Marital status:      Spouse name: Not on file   • Number of children: Not on file   • Years of education: Not on file   • Highest education level: Not on file   Tobacco Use   • Smoking  status: Former Smoker     Packs/day: 1.00     Years: 40.00     Pack years: 40.00   • Smokeless tobacco: Never Used   Substance and Sexual Activity   • Alcohol use: No   • Drug use: No   • Sexual activity: Defer       Family History:     Family History   Problem Relation Age of Onset   • No Known Problems Father    • No Known Problems Mother        Review of Systems:     Review of Systems   Constitutional: Negative.    HENT: Negative.    Eyes: Negative.    Respiratory: Negative.    Cardiovascular: Negative.    Gastrointestinal: Negative.    Endocrine: Negative.    Genitourinary: Positive for hematuria.   Musculoskeletal: Negative.    Allergic/Immunologic: Negative.    Neurological: Negative.    Hematological: Negative.    Psychiatric/Behavioral: Negative.        Physical Exam:     Physical Exam  Vitals and nursing note reviewed.   Constitutional:       Appearance: He is well-developed.   HENT:      Head: Normocephalic and atraumatic.   Eyes:      Conjunctiva/sclera: Conjunctivae normal.      Pupils: Pupils are equal, round, and reactive to light.   Cardiovascular:      Rate and Rhythm: Normal rate and regular rhythm.      Heart sounds: Normal heart sounds.   Pulmonary:      Effort: Pulmonary effort is normal.      Breath sounds: Normal breath sounds.   Abdominal:      General: Bowel sounds are normal.      Palpations: Abdomen is soft.   Genitourinary:     Comments: Circumcised phallus bilaterally descended very atrophic testes smooth firm 30 g prostate  Musculoskeletal:         General: Normal range of motion.      Cervical back: Normal range of motion.   Skin:     General: Skin is warm and dry.   Neurological:      Mental Status: He is alert and oriented to person, place, and time.      Deep Tendon Reflexes: Reflexes are normal and symmetric.   Psychiatric:         Behavior: Behavior normal.         Thought Content: Thought content normal.         Judgment: Judgment normal.         I have reviewed the following  portions of the patient's history: allergies, current medications, past family history, past medical history, past social history, past surgical history, problem list and ROS and confirm it's accurate.      Procedure:       Assessment/Plan:   Hematuria-patient was diagnosed with hematuria.  We discussed the significance of microscopic hematuria versus gross hematuria.  We discussed the presence or absence of the type of clotting identified including vermiform clots consistent with ureteral bleeding versus just pink tinged urine versus maurice clots.  We discussed the presence of urokinase in the urine which causes the clots to dissolve with time.  We discussed the fact that it takes only a very small amount of blood in the urine to make the urine very red appearing and therefore give one the impression that there is much more blood loss that is really present.  He discussed the use of both an upper and lower tract investigation.  I discussed the fact that an upper tract investigation includes a normal renal ultrasound with a significant risks of missing more subtle lesions.  Progressing to a CT scan without contrast and finally the CT scan with contrast being the gold standard to diagnose the small neoplasms.  We discussed the lower tract investigation consisting of a cystoscopy in many of the cases where the upper tract study is negative.  Also discussed the fact that if there is a contraindication to the use of contrast we would do a noncontrasted study and this also has a chance of missing small lesions.  The specific instance would be cases of diabetes and chronic renal insufficiency.  Discussed the fact that there is about a 96% chance of a negative workup with episodes of microscopic hematuria and with much greater in the face of gross hematuria.  We discussed the fact that this is a non-cumulative test.  In other words if there is hematuria next year I would recommend continuing to work up the condition because  of the fact that neoplasms may be small at the first workup and easily are missed.  I discussed the differential diagnosis of hematuria including trauma, neoplasia, infection, etc.  We discussed the fact that if there is any history of chronic kidney disease or risk factors such as diabetes for contrast a noncontrasted study will be utilized.  We will initiate an investigation.  Needs upper and lower tract investigation  Recurrent urinary tract infections-patient has been referred and diagnosed with recurrent urinary tract infections.  We discussed the types of organisms that are found in the urinary tract indicating that the vast majority are results of the patient's own gastrointestinal matteo.  We discussed how many of the antibiotics that are utilized can actually exacerbate these infections by creating resistant organisms and there is only a very few antibiotics that are concentrated in the urine and do not affect the rectal reservoir nor cause recurrent yeast vaginitis.  We discussed the risk factors for recurrent infections being intercourse in younger patients and atrophic changes in older patients.  We discussed the symptoms that are found including pain, pressure, burning, frequency, urgency suprapubic pain and painful intercourse.  I discussed upper tract symptoms including fevers, chills, and indicated the workup would be much more aggressive if the patient were to present with recurrent infections in the face of upper tract symptomatology such as fever.  I discussed the history of vesicoureteral reflux in young patients and finally chronic renal scarring as a result of such.  I recommend concomitant probiotics with treatment with antibiotics to protect the rectal reservoir including over-the-counter yogurt preparations to maurice oral pills containing the appropriate probiotics.  Culture the urine and empirically start him on antibiotics            Patient's Body mass index is 26.58 kg/m². BMI is above  normal parameters. Recommendations include: educational material.              This document has been electronically signed by JOBY GILL MD May 3, 2021 14:11 EDT

## 2021-05-04 LAB — BACTERIA SPEC AEROBE CULT: NO GROWTH

## 2021-05-05 PROBLEM — R31.0 GROSS HEMATURIA: Status: ACTIVE | Noted: 2021-05-05

## 2021-05-05 PROBLEM — N30.00 ACUTE CYSTITIS: Status: ACTIVE | Noted: 2021-05-05

## 2021-08-05 ENCOUNTER — PROCEDURE VISIT (OUTPATIENT)
Dept: UROLOGY | Facility: CLINIC | Age: 71
End: 2021-08-05

## 2021-08-05 VITALS — HEIGHT: 72 IN | BODY MASS INDEX: 26.55 KG/M2 | WEIGHT: 195.99 LBS

## 2021-08-05 DIAGNOSIS — R35.0 FREQUENCY OF MICTURITION: Primary | ICD-10-CM

## 2021-08-05 DIAGNOSIS — R31.0 GROSS HEMATURIA: ICD-10-CM

## 2021-08-05 PROCEDURE — 99213 OFFICE O/P EST LOW 20 MIN: CPT | Performed by: UROLOGY

## 2021-08-05 RX ORDER — GENTAMICIN SULFATE 40 MG/ML
80 INJECTION, SOLUTION INTRAMUSCULAR; INTRAVENOUS ONCE
Status: DISCONTINUED | OUTPATIENT
Start: 2021-08-05 | End: 2021-08-05

## 2021-08-05 NOTE — PROGRESS NOTES
Chief Complaint:          Chief Complaint   Patient presents with   • Urinary Frequency     CYSTO       HPI:   70 y.o. male does for cystoscopy but never got his CT scheduled for August 18 I will see him back after he completes his CT      Past Medical History:        Past Medical History:   Diagnosis Date   • Cancer (CMS/HCC)    • Hypertension    • Stroke (CMS/HCC)          Current Meds:     Current Outpatient Medications   Medication Sig Dispense Refill   • apixaban (ELIQUIS) 5 MG tablet tablet Take 1 tablet by mouth 2 (Two) Times a Day. 60 tablet 0   • FLUoxetine (PROzac) 20 MG capsule Take 20 mg by mouth Daily.     • losartan-hydrochlorothiazide (HYZAAR) 100-25 MG per tablet Take 1 tablet by mouth Daily.     • simvastatin (ZOCOR) 20 MG tablet Take 1 tablet by mouth Every Night.     • sulfamethoxazole-trimethoprim (BACTRIM DS,SEPTRA DS) 800-160 MG per tablet Take 1 tablet by mouth 2 (two) times a day.       No current facility-administered medications for this visit.        Allergies:      No Known Allergies     Past Surgical History:     Past Surgical History:   Procedure Laterality Date   • COLONOSCOPY N/A 1/5/2018    Procedure: COLONOSCOPY FOR SCREENING;  Surgeon: Neeraj Monahan III, MD;  Location: [facility];  Service:    • SHOULDER ROTATOR CUFF REPAIR Left          Social History:     Social History     Socioeconomic History   • Marital status:      Spouse name: Not on file   • Number of children: Not on file   • Years of education: Not on file   • Highest education level: Not on file   Tobacco Use   • Smoking status: Former Smoker     Packs/day: 1.00     Years: 40.00     Pack years: 40.00   • Smokeless tobacco: Never Used   Substance and Sexual Activity   • Alcohol use: No   • Drug use: No   • Sexual activity: Defer       Family History:     Family History   Problem Relation Age of Onset   • No Known Problems Father    • No Known Problems Mother        Review of Systems:     Review of Systems    Constitutional: Negative.    HENT: Negative.    Eyes: Negative.    Respiratory: Negative.    Cardiovascular: Negative.    Gastrointestinal: Negative.    Endocrine: Negative.    Musculoskeletal: Negative.    Allergic/Immunologic: Negative.    Neurological: Negative.    Hematological: Negative.    Psychiatric/Behavioral: Negative.        Physical Exam:     Physical Exam  Vitals and nursing note reviewed.   Constitutional:       Appearance: He is well-developed.   HENT:      Head: Normocephalic and atraumatic.   Eyes:      Conjunctiva/sclera: Conjunctivae normal.      Pupils: Pupils are equal, round, and reactive to light.   Cardiovascular:      Rate and Rhythm: Normal rate and regular rhythm.      Heart sounds: Normal heart sounds.   Pulmonary:      Effort: Pulmonary effort is normal.      Breath sounds: Normal breath sounds.   Abdominal:      General: Bowel sounds are normal.      Palpations: Abdomen is soft.   Musculoskeletal:         General: Normal range of motion.      Cervical back: Normal range of motion.   Skin:     General: Skin is warm and dry.   Neurological:      Mental Status: He is alert and oriented to person, place, and time.      Deep Tendon Reflexes: Reflexes are normal and symmetric.   Psychiatric:         Behavior: Behavior normal.         Thought Content: Thought content normal.         Judgment: Judgment normal.         I have reviewed the following portions of the patient's history: allergies, current medications, past family history, past medical history, past social history, past surgical history, problem list and ROS and confirm it's accurate.      Procedure:       Assessment/Plan:   Hematuria-patient was diagnosed with hematuria.  We discussed the significance of microscopic hematuria versus gross hematuria.  We discussed the presence or absence of the type of clotting identified including vermiform clots consistent with ureteral bleeding versus just pink tinged urine versus maurice clots.   We discussed the presence of urokinase in the urine which causes the clots to dissolve with time.  We discussed the fact that it takes only a very small amount of blood in the urine to make the urine very red appearing and therefore give one the impression that there is much more blood loss that is really present.  He discussed the use of both an upper and lower tract investigation.  I discussed the fact that an upper tract investigation includes a normal renal ultrasound with a significant risks of missing more subtle lesions.  Progressing to a CT scan without contrast and finally the CT scan with contrast being the gold standard to diagnose the small neoplasms.  We discussed the lower tract investigation consisting of a cystoscopy in many of the cases where the upper tract study is negative.  Also discussed the fact that if there is a contraindication to the use of contrast we would do a noncontrasted study and this also has a chance of missing small lesions.  The specific instance would be cases of diabetes and chronic renal insufficiency.  Discussed the fact that there is about a 96% chance of a negative workup with episodes of microscopic hematuria and with much greater in the face of gross hematuria.  We discussed the fact that this is a non-cumulative test.  In other words if there is hematuria next year I would recommend continuing to work up the condition because of the fact that neoplasms may be small at the first workup and easily are missed.  I discussed the differential diagnosis of hematuria including trauma, neoplasia, infection, etc.  We discussed the fact that if there is any history of chronic kidney disease or risk factors such as diabetes for contrast a noncontrasted study will be utilized.  We will initiate an investigation.  Needs his upper tract study before completing his cystoscopy this is scheduled for August 18                  This document has been electronically signed by JOBY  MD JAIRO August 5, 2021 12:56 EDT

## 2021-08-18 ENCOUNTER — HOSPITAL ENCOUNTER (OUTPATIENT)
Dept: CT IMAGING | Facility: HOSPITAL | Age: 71
Discharge: HOME | End: 2021-08-18
Admitting: UROLOGY

## 2021-08-18 DIAGNOSIS — N30.01 ACUTE CYSTITIS WITH HEMATURIA: ICD-10-CM

## 2021-08-18 DIAGNOSIS — R31.0 GROSS HEMATURIA: ICD-10-CM

## 2021-08-18 LAB — CREAT BLDA-MCNC: 1.5 MG/DL (ref 0.6–1.3)

## 2021-08-18 PROCEDURE — 74178 CT ABD&PLV WO CNTR FLWD CNTR: CPT | Performed by: RADIOLOGY

## 2021-08-18 PROCEDURE — 82565 ASSAY OF CREATININE: CPT

## 2021-08-18 PROCEDURE — 74178 CT ABD&PLV WO CNTR FLWD CNTR: CPT

## 2021-08-18 PROCEDURE — 25010000002 IOPAMIDOL 61 % SOLUTION: Performed by: UROLOGY

## 2021-08-18 RX ADMIN — IOPAMIDOL 80 ML: 612 INJECTION, SOLUTION INTRAVENOUS at 09:02

## 2021-09-03 ENCOUNTER — IMMUNIZATION (OUTPATIENT)
Dept: VACCINE CLINIC | Facility: HOSPITAL | Age: 71
End: 2021-09-03

## 2021-09-03 PROCEDURE — 0003A: CPT | Performed by: INTERNAL MEDICINE

## 2021-09-03 PROCEDURE — 91300 HC SARSCOV02 VAC 30MCG/0.3ML IM: CPT | Performed by: INTERNAL MEDICINE

## 2021-09-28 ENCOUNTER — PROCEDURE VISIT (OUTPATIENT)
Dept: UROLOGY | Facility: CLINIC | Age: 71
End: 2021-09-28

## 2021-09-28 VITALS — HEIGHT: 72 IN | WEIGHT: 196 LBS | BODY MASS INDEX: 26.55 KG/M2

## 2021-09-28 DIAGNOSIS — N99.111 POSTPROCEDURAL BULBOUS URETHRAL STRICTURE: ICD-10-CM

## 2021-09-28 DIAGNOSIS — R31.0 GROSS HEMATURIA: Primary | ICD-10-CM

## 2021-09-28 PROCEDURE — 52000 CYSTOURETHROSCOPY: CPT | Performed by: UROLOGY

## 2021-09-28 PROCEDURE — 99213 OFFICE O/P EST LOW 20 MIN: CPT | Performed by: UROLOGY

## 2021-09-28 NOTE — PROGRESS NOTES
Chief Complaint:          Chief Complaint   Patient presents with   • Urinary Frequency     cystoscopy        HPI:   70 y.o. male for review upper tract study was reviewed was negative.  He has significant frequency.  Lower tract investigation disclosed a tight stricture I will set him up for surgical intervention.      Past Medical History:        Past Medical History:   Diagnosis Date   • Cancer (CMS/HCC)    • Hypertension    • Stroke (CMS/HCC)          Current Meds:     Current Outpatient Medications   Medication Sig Dispense Refill   • apixaban (ELIQUIS) 5 MG tablet tablet Take 1 tablet by mouth 2 (Two) Times a Day. 60 tablet 0   • FLUoxetine (PROzac) 20 MG capsule Take 20 mg by mouth Daily.     • losartan-hydrochlorothiazide (HYZAAR) 100-25 MG per tablet Take 1 tablet by mouth Daily.     • simvastatin (ZOCOR) 20 MG tablet Take 1 tablet by mouth Every Night.       No current facility-administered medications for this visit.        Allergies:      No Known Allergies     Past Surgical History:     Past Surgical History:   Procedure Laterality Date   • COLONOSCOPY N/A 1/5/2018    Procedure: COLONOSCOPY FOR SCREENING;  Surgeon: Neeraj Monahan III, MD;  Location: [facility];  Service:    • SHOULDER ROTATOR CUFF REPAIR Left          Social History:     Social History     Socioeconomic History   • Marital status:      Spouse name: Not on file   • Number of children: Not on file   • Years of education: Not on file   • Highest education level: Not on file   Tobacco Use   • Smoking status: Former Smoker     Packs/day: 1.00     Years: 40.00     Pack years: 40.00   • Smokeless tobacco: Never Used   Substance and Sexual Activity   • Alcohol use: No   • Drug use: No   • Sexual activity: Defer       Family History:     Family History   Problem Relation Age of Onset   • No Known Problems Father    • No Known Problems Mother        Review of Systems:     Review of Systems   Constitutional: Negative.    HENT: Negative.     Eyes: Negative.    Respiratory: Negative.    Cardiovascular: Negative.    Gastrointestinal: Negative.    Endocrine: Negative.    Genitourinary: Positive for difficulty urinating and frequency.   Musculoskeletal: Negative.    Allergic/Immunologic: Negative.    Neurological: Negative.    Hematological: Negative.    Psychiatric/Behavioral: Negative.        Physical Exam:     Physical Exam  Vitals and nursing note reviewed.   Constitutional:       Appearance: He is well-developed.   HENT:      Head: Normocephalic and atraumatic.   Eyes:      Conjunctiva/sclera: Conjunctivae normal.      Pupils: Pupils are equal, round, and reactive to light.   Cardiovascular:      Rate and Rhythm: Normal rate and regular rhythm.      Heart sounds: Normal heart sounds.   Pulmonary:      Effort: Pulmonary effort is normal.      Breath sounds: Normal breath sounds.   Abdominal:      General: Bowel sounds are normal.      Palpations: Abdomen is soft.   Genitourinary:     Penis: Normal.       Testes: Normal.   Musculoskeletal:         General: Normal range of motion.      Cervical back: Normal range of motion.   Skin:     General: Skin is warm and dry.   Neurological:      Mental Status: He is alert and oriented to person, place, and time.      Deep Tendon Reflexes: Reflexes are normal and symmetric.   Psychiatric:         Behavior: Behavior normal.         Thought Content: Thought content normal.         Judgment: Judgment normal.         I have reviewed the following portions of the patient's history: allergies, current medications, past family history, past medical history, past social history, past surgical history, problem list and ROS and confirm it's accurate.      Procedure:   Cystoscopy:  Patient presents today for cystourethroscopy.  I went ahead and obtained an informed consent including the risk of anesthesia, bleeding, infection, etc.  After prep and drape in a sterile fashion in the low dorsal lithotomy position the urethra  was gently anesthetized with 10 cc of 2% viscous Xylocaine jelly.  After an appropriate period of topical anesthesia I used the Olympus digital 14 [place] flexible cystoscope to examine the anterior urethra which was tightly strictured at the bulbomembranous junction the patient was given 80 mg of gentamicin in an intramuscular fashion  as prophylaxis for the cystoscopy and released from the clinic.    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  Hematuria-patient was diagnosed with hematuria.  We discussed the significance of microscopic hematuria versus gross hematuria.  We discussed the presence or absence of the type of clotting identified including vermiform clots consistent with ureteral bleeding versus just pink tinged urine versus maurice clots.  We discussed the presence of urokinase in the urine which causes the clots to dissolve with time.  We discussed the fact that it takes only a very small amount of blood in the urine to make the urine very red appearing and therefore give one the impression that there is much more blood loss that is really present.  He discussed the use of both an upper and lower tract investigation.  I discussed the fact that an upper tract investigation includes a normal renal ultrasound with a significant risks of missing more subtle lesions.  Progressing to a CT scan without contrast and finally the CT scan with contrast being the gold standard to diagnose the small neoplasms.  We discussed the lower tract investigation consisting of a cystoscopy in many of the cases where the upper tract study is negative.  Also discussed the fact that if there is a contraindication to the use of contrast we would do a noncontrasted study and this also has a chance of missing small lesions.  The specific instance would be cases of diabetes and chronic renal insufficiency.  Discussed the fact that there is about a 96% chance of a negative  workup with episodes of microscopic hematuria and with much greater in the face of gross hematuria.  We discussed the fact that this is a non-cumulative test.  In other words if there is hematuria next year I would recommend continuing to work up the condition because of the fact that neoplasms may be small at the first workup and easily are missed.  I discussed the differential diagnosis of hematuria including trauma, neoplasia, infection, etc.  We discussed the fact that if there is any history of chronic kidney disease or risk factors such as diabetes for contrast a noncontrasted study will be utilized.  We will initiate an investigation.  Upper and lower tract investigation negative thus far but I did not get a good view of the bladder mucosa this we done at the time of surgery                  This document has been electronically signed by JOBY GILL MD September 28, 2021 12:53 EDT

## 2021-09-30 PROBLEM — N99.111 POSTPROCEDURAL BULBOUS URETHRAL STRICTURE: Status: ACTIVE | Noted: 2021-09-30

## 2021-11-01 DIAGNOSIS — N99.111 POSTPROCEDURAL BULBOUS URETHRAL STRICTURE: Primary | ICD-10-CM

## 2021-11-01 RX ORDER — GENTAMICIN SULFATE 80 MG/100ML
80 INJECTION, SOLUTION INTRAVENOUS ONCE
Status: CANCELLED | OUTPATIENT
Start: 2021-11-08 | End: 2021-11-01

## 2021-11-02 DIAGNOSIS — N99.111 POSTPROCEDURAL BULBOUS URETHRAL STRICTURE: Primary | ICD-10-CM

## 2021-11-05 ENCOUNTER — PRE-ADMISSION TESTING (OUTPATIENT)
Dept: PREADMISSION TESTING | Facility: HOSPITAL | Age: 71
End: 2021-11-05

## 2021-11-05 ENCOUNTER — LAB (OUTPATIENT)
Dept: LAB | Facility: HOSPITAL | Age: 71
End: 2021-11-05

## 2021-11-05 DIAGNOSIS — N99.111 POSTPROCEDURAL BULBOUS URETHRAL STRICTURE: ICD-10-CM

## 2021-11-05 LAB
ANION GAP SERPL CALCULATED.3IONS-SCNC: 10.2 MMOL/L (ref 5–15)
BUN SERPL-MCNC: 18 MG/DL (ref 8–23)
BUN/CREAT SERPL: 14.8 (ref 7–25)
CALCIUM SPEC-SCNC: 9.9 MG/DL (ref 8.6–10.5)
CHLORIDE SERPL-SCNC: 102 MMOL/L (ref 98–107)
CO2 SERPL-SCNC: 26.8 MMOL/L (ref 22–29)
CREAT SERPL-MCNC: 1.22 MG/DL (ref 0.76–1.27)
DEPRECATED RDW RBC AUTO: 43.8 FL (ref 37–54)
ERYTHROCYTE [DISTWIDTH] IN BLOOD BY AUTOMATED COUNT: 13.3 % (ref 12.3–15.4)
GFR SERPL CREATININE-BSD FRML MDRD: 59 ML/MIN/1.73
GLUCOSE SERPL-MCNC: 99 MG/DL (ref 65–99)
HCT VFR BLD AUTO: 34.7 % (ref 37.5–51)
HGB BLD-MCNC: 11.1 G/DL (ref 13–17.7)
MCH RBC QN AUTO: 28.8 PG (ref 26.6–33)
MCHC RBC AUTO-ENTMCNC: 32 G/DL (ref 31.5–35.7)
MCV RBC AUTO: 89.9 FL (ref 79–97)
PLATELET # BLD AUTO: 191 10*3/MM3 (ref 140–450)
PMV BLD AUTO: 10.4 FL (ref 6–12)
POTASSIUM SERPL-SCNC: 4.5 MMOL/L (ref 3.5–5.2)
RBC # BLD AUTO: 3.86 10*6/MM3 (ref 4.14–5.8)
SODIUM SERPL-SCNC: 139 MMOL/L (ref 136–145)
WBC # BLD AUTO: 3.87 10*3/MM3 (ref 3.4–10.8)

## 2021-11-05 PROCEDURE — U0004 COV-19 TEST NON-CDC HGH THRU: HCPCS | Performed by: UROLOGY

## 2021-11-05 PROCEDURE — C9803 HOPD COVID-19 SPEC COLLECT: HCPCS

## 2021-11-05 PROCEDURE — 80048 BASIC METABOLIC PNL TOTAL CA: CPT

## 2021-11-05 PROCEDURE — U0005 INFEC AGEN DETEC AMPLI PROBE: HCPCS | Performed by: UROLOGY

## 2021-11-05 PROCEDURE — 85027 COMPLETE CBC AUTOMATED: CPT

## 2021-11-05 PROCEDURE — 36415 COLL VENOUS BLD VENIPUNCTURE: CPT

## 2021-11-05 NOTE — DISCHARGE INSTRUCTIONS
TAKE the following medications the morning of surgery:  All heart or blood pressure medications    HOLD all diabetic medications the morning of surgery as ordered by physician.    Please discontinue all blood thinners and anticoagulants (except aspirin) prior to surgery as per your surgeon and cardiologist instructions.  Aspirin may be continued up to the day prior to surgery.     11/08/21  ARRIVAL TIME PER DR GILL OFFICE    General Instructions:  · Do not eat or drink after midnight:11/7/21  includes water, mints, or gum. You may brush your teeth.  Dental appliances that are removable must be taken out day of surgery.  · Do not smoke, chew tobacco, or drink alcohol.  · Bring medications in original bottles, any inhalers and if applicable your C-PAP/BI-PAP machine.  · Bring any papers given to you in the doctor's office.  · Wear clean comfortable clothes and socks.  · Do not wear contact lenses or make-up. Bring a case for your glasses if applicable.  · Bring crutches or walker if applicable.  · Leave all other valuables and jewelry at home.    If you were given a blood bank ID arm band remember to bring it with you the day of surgery.    Preventing a Surgical Site Infection:  Shower the night before surgery (unless instructed other wise) using a fresh bar of anti-bacterial soap (such as Dial) and clean washcloth. Dry with a clean towel and dress in clean clothing.  For 2 to 3 days before surgery, avoid shaving with a razor near where you will have surgery because the razor can irritate skin and make it easier to develop an infection. Ask your surgeon if you will be receiving antibiotics prior to surgery.  Make sure you, your family, and all healthcare providers clear their hands with soap and water or an alcohol-based hand  before caring for you or your wound.  If at all possible, quit smoking as many days before surgery as you can.    Day of surgery:  Upon arrival, a Pre-op nurse and  Anesthesiologist will review your health history, obtain vital signs, and answer questions you may have. The only belongings needed at this time will be your home medications and if applicable your C-PAP/BI-PAP machine. If you are staying overnight your family can leave the rest of your belongings in the car and bring them to your room later. A Pre-op nurse will start an IV and you may receive medication in preparation for surgery, including something to help you relax. Your family will be able to see you in the Pre-op area. While you are in surgery your family should notify the waiting room  if they leave the waiting room area and provide a contact phone number.    Please be aware that surgery does come with discomfort. We want to make every effort to control your discomfort so please discuss any uncontrolled symptoms with your nurse. Your doctor will most likely have prescribed pain medications.    If you are going home after surgery you will receive individualized written care instructions before being discharged. A responsible adult must drive you to and from the hospital on the day of surgery and stay with you for 24 hours.    If you are staying overnight following surgery, you will be transported to your hospital room following the recovery period.  [facility] has all private rooms.    If you have any questions please call Pre-Admission Testing at [phone].  Deductibles and co-payments are collected on the day of service. Please be prepared to pay the required co-pay, deductible or deposit on the day of service as defined by your plan.    A RESPONSIBLE PERSON MUST REMAIN IN THE WAITING ROOM DURING YOUR PROCEDURE AND A RESPONSIBLE  MUST BE AVAILABLE UPON YOUR DISCHARGE.

## 2021-11-06 LAB — SARS-COV-2 RNA PNL SPEC NAA+PROBE: NOT DETECTED

## 2021-11-08 ENCOUNTER — ANESTHESIA (OUTPATIENT)
Dept: PERIOP | Facility: HOSPITAL | Age: 71
End: 2021-11-08

## 2021-11-08 ENCOUNTER — TELEPHONE (OUTPATIENT)
Dept: UROLOGY | Facility: CLINIC | Age: 71
End: 2021-11-08

## 2021-11-08 ENCOUNTER — APPOINTMENT (OUTPATIENT)
Dept: GENERAL RADIOLOGY | Facility: HOSPITAL | Age: 71
End: 2021-11-08

## 2021-11-08 ENCOUNTER — ANESTHESIA EVENT (OUTPATIENT)
Dept: PERIOP | Facility: HOSPITAL | Age: 71
End: 2021-11-08

## 2021-11-08 ENCOUNTER — HOSPITAL ENCOUNTER (OUTPATIENT)
Facility: HOSPITAL | Age: 71
Setting detail: HOSPITAL OUTPATIENT SURGERY
Discharge: HOME | End: 2021-11-08
Attending: UROLOGY | Admitting: UROLOGY

## 2021-11-08 VITALS
WEIGHT: 194.4 LBS | TEMPERATURE: 97.7 F | SYSTOLIC BLOOD PRESSURE: 128 MMHG | RESPIRATION RATE: 14 BRPM | OXYGEN SATURATION: 98 % | DIASTOLIC BLOOD PRESSURE: 82 MMHG | HEIGHT: 72 IN | BODY MASS INDEX: 26.33 KG/M2 | HEART RATE: 60 BPM

## 2021-11-08 DIAGNOSIS — N99.111 POSTPROCEDURAL BULBOUS URETHRAL STRICTURE: ICD-10-CM

## 2021-11-08 PROCEDURE — 25010000002 PROPOFOL 10 MG/ML EMULSION: Performed by: NURSE ANESTHETIST, CERTIFIED REGISTERED

## 2021-11-08 PROCEDURE — 25010000002 ONDANSETRON PER 1 MG: Performed by: NURSE ANESTHETIST, CERTIFIED REGISTERED

## 2021-11-08 PROCEDURE — S0260 H&P FOR SURGERY: HCPCS | Performed by: UROLOGY

## 2021-11-08 PROCEDURE — C1769 GUIDE WIRE: HCPCS | Performed by: UROLOGY

## 2021-11-08 PROCEDURE — 25010000002 MIDAZOLAM PER 1 MG: Performed by: NURSE ANESTHETIST, CERTIFIED REGISTERED

## 2021-11-08 PROCEDURE — 25010000002 FENTANYL CITRATE (PF) 50 MCG/ML SOLUTION: Performed by: NURSE ANESTHETIST, CERTIFIED REGISTERED

## 2021-11-08 PROCEDURE — 25010000002 GENTAMICIN PER 80 MG: Performed by: UROLOGY

## 2021-11-08 PROCEDURE — 52281 CYSTOSCOPY AND TREATMENT: CPT | Performed by: UROLOGY

## 2021-11-08 RX ORDER — FAMOTIDINE 10 MG/ML
INJECTION, SOLUTION INTRAVENOUS AS NEEDED
Status: DISCONTINUED | OUTPATIENT
Start: 2021-11-08 | End: 2021-11-08 | Stop reason: SURG

## 2021-11-08 RX ORDER — IPRATROPIUM BROMIDE AND ALBUTEROL SULFATE 2.5; .5 MG/3ML; MG/3ML
3 SOLUTION RESPIRATORY (INHALATION) ONCE AS NEEDED
Status: DISCONTINUED | OUTPATIENT
Start: 2021-11-08 | End: 2021-11-08 | Stop reason: HOSPADM

## 2021-11-08 RX ORDER — SODIUM CHLORIDE, SODIUM LACTATE, POTASSIUM CHLORIDE, CALCIUM CHLORIDE 600; 310; 30; 20 MG/100ML; MG/100ML; MG/100ML; MG/100ML
125 INJECTION, SOLUTION INTRAVENOUS ONCE
Status: COMPLETED | OUTPATIENT
Start: 2021-11-08 | End: 2021-11-08

## 2021-11-08 RX ORDER — GENTAMICIN SULFATE 80 MG/100ML
80 INJECTION, SOLUTION INTRAVENOUS ONCE
Status: COMPLETED | OUTPATIENT
Start: 2021-11-08 | End: 2021-11-08

## 2021-11-08 RX ORDER — SODIUM CHLORIDE 0.9 % (FLUSH) 0.9 %
10 SYRINGE (ML) INJECTION AS NEEDED
Status: DISCONTINUED | OUTPATIENT
Start: 2021-11-08 | End: 2021-11-08 | Stop reason: HOSPADM

## 2021-11-08 RX ORDER — ONDANSETRON 2 MG/ML
4 INJECTION INTRAMUSCULAR; INTRAVENOUS AS NEEDED
Status: DISCONTINUED | OUTPATIENT
Start: 2021-11-08 | End: 2021-11-08 | Stop reason: HOSPADM

## 2021-11-08 RX ORDER — SODIUM CHLORIDE, SODIUM LACTATE, POTASSIUM CHLORIDE, CALCIUM CHLORIDE 600; 310; 30; 20 MG/100ML; MG/100ML; MG/100ML; MG/100ML
INJECTION, SOLUTION INTRAVENOUS CONTINUOUS PRN
Status: DISCONTINUED | OUTPATIENT
Start: 2021-11-08 | End: 2021-11-08 | Stop reason: SURG

## 2021-11-08 RX ORDER — ONDANSETRON 2 MG/ML
INJECTION INTRAMUSCULAR; INTRAVENOUS AS NEEDED
Status: DISCONTINUED | OUTPATIENT
Start: 2021-11-08 | End: 2021-11-08 | Stop reason: SURG

## 2021-11-08 RX ORDER — MAGNESIUM HYDROXIDE 1200 MG/15ML
LIQUID ORAL AS NEEDED
Status: DISCONTINUED | OUTPATIENT
Start: 2021-11-08 | End: 2021-11-08 | Stop reason: HOSPADM

## 2021-11-08 RX ORDER — FENTANYL CITRATE 50 UG/ML
INJECTION, SOLUTION INTRAMUSCULAR; INTRAVENOUS AS NEEDED
Status: DISCONTINUED | OUTPATIENT
Start: 2021-11-08 | End: 2021-11-08 | Stop reason: SURG

## 2021-11-08 RX ORDER — PROPOFOL 10 MG/ML
VIAL (ML) INTRAVENOUS CONTINUOUS PRN
Status: DISCONTINUED | OUTPATIENT
Start: 2021-11-08 | End: 2021-11-08 | Stop reason: SURG

## 2021-11-08 RX ORDER — HYDROCODONE BITARTRATE AND ACETAMINOPHEN 10; 325 MG/1; MG/1
1 TABLET ORAL EVERY 4 HOURS PRN
Qty: 12 TABLET | Refills: 0 | Status: SHIPPED | OUTPATIENT
Start: 2021-11-08

## 2021-11-08 RX ORDER — SODIUM CHLORIDE 0.9 % (FLUSH) 0.9 %
10 SYRINGE (ML) INJECTION EVERY 12 HOURS SCHEDULED
Status: DISCONTINUED | OUTPATIENT
Start: 2021-11-08 | End: 2021-11-08 | Stop reason: HOSPADM

## 2021-11-08 RX ORDER — ATROPA BELLADONNA AND OPIUM 16.2; 3 MG/1; MG/1
SUPPOSITORY RECTAL AS NEEDED
Status: DISCONTINUED | OUTPATIENT
Start: 2021-11-08 | End: 2021-11-08 | Stop reason: HOSPADM

## 2021-11-08 RX ORDER — MIDAZOLAM HYDROCHLORIDE 1 MG/ML
INJECTION INTRAMUSCULAR; INTRAVENOUS AS NEEDED
Status: DISCONTINUED | OUTPATIENT
Start: 2021-11-08 | End: 2021-11-08 | Stop reason: SURG

## 2021-11-08 RX ORDER — FENTANYL CITRATE 50 UG/ML
50 INJECTION, SOLUTION INTRAMUSCULAR; INTRAVENOUS
Status: DISCONTINUED | OUTPATIENT
Start: 2021-11-08 | End: 2021-11-08 | Stop reason: HOSPADM

## 2021-11-08 RX ORDER — MIDAZOLAM HYDROCHLORIDE 1 MG/ML
0.5 INJECTION INTRAMUSCULAR; INTRAVENOUS
Status: DISCONTINUED | OUTPATIENT
Start: 2021-11-08 | End: 2021-11-08 | Stop reason: HOSPADM

## 2021-11-08 RX ADMIN — MIDAZOLAM 2 MG: 1 INJECTION INTRAMUSCULAR; INTRAVENOUS at 09:19

## 2021-11-08 RX ADMIN — SODIUM CHLORIDE, POTASSIUM CHLORIDE, SODIUM LACTATE AND CALCIUM CHLORIDE: 600; 310; 30; 20 INJECTION, SOLUTION INTRAVENOUS at 09:19

## 2021-11-08 RX ADMIN — FAMOTIDINE 20 MG: 10 INJECTION INTRAVENOUS at 09:19

## 2021-11-08 RX ADMIN — FENTANYL CITRATE 100 MCG: 50 INJECTION INTRAMUSCULAR; INTRAVENOUS at 09:19

## 2021-11-08 RX ADMIN — PROPOFOL 100 MCG/KG/MIN: 10 INJECTION, EMULSION INTRAVENOUS at 09:23

## 2021-11-08 RX ADMIN — SODIUM CHLORIDE, POTASSIUM CHLORIDE, SODIUM LACTATE AND CALCIUM CHLORIDE 125 ML/HR: 600; 310; 30; 20 INJECTION, SOLUTION INTRAVENOUS at 09:10

## 2021-11-08 RX ADMIN — ONDANSETRON 4 MG: 2 INJECTION INTRAMUSCULAR; INTRAVENOUS at 09:19

## 2021-11-08 RX ADMIN — GENTAMICIN SULFATE 80 MG: 80 INJECTION, SOLUTION INTRAVENOUS at 09:26

## 2021-11-08 NOTE — ANESTHESIA PREPROCEDURE EVALUATION
Anesthesia Evaluation     Patient summary reviewed and Nursing notes reviewed   NPO Solid Status: Waived due to emergency             Airway   Mallampati: II  TM distance: >3 FB  Neck ROM: full  Dental    (+) upper dentures, lower dentures and edentulous    Pulmonary     breath sounds clear to auscultation  Cardiovascular   Exercise tolerance: good (4-7 METS)    Rhythm: regular  Rate: normal    (+) hypertension, dysrhythmias Atrial Fib, hyperlipidemia,       Neuro/Psych  (+) CVA,     GI/Hepatic/Renal/Endo      Musculoskeletal     Abdominal     Abdomen: soft.   Substance History      OB/GYN          Other                        Anesthesia Plan    ASA 3     general     intravenous induction     Anesthetic plan, all risks, benefits, and alternatives have been provided, discussed and informed consent has been obtained with: patient.    Plan discussed with CRNA.

## 2021-11-08 NOTE — TELEPHONE ENCOUNTER
Called patient back and its just bleeding a little where the cath is. Patient has an appt tomorrow. I advised to keep appointment. Patient verbalized understanding.

## 2021-11-08 NOTE — OP NOTE
URETHRAL DILATATION  Procedure Note    Jc Graves  11/8/2021    Pre-op Diagnosis:   Postprocedural bulbous urethral stricture [N99.111]    Post-op Diagnosis:     Post-Op Diagnosis Codes:     * Postprocedural bulbous urethral stricture [N99.111]    Procedure/CPT® Codes:  70-year-old white male presents with urethral stricture he had an attempted cystoscopy in the office because of voiding dysfunction unable because of meatal stenosis he now presents for anesthetic dilation following an informed consent brought the operative suite prepped and draped in a sterile fashion I went ahead and gently anesthetized the urethra with 20 cc of 2% viscous Xylocaine jelly advanced attempted the scope but he had a tight meatal stenosis I dilated to 24 Czech I used the 21 Czech cystoscope he had a tight bulbomembranous stricture I placed an angiographic Glidewire and dilated using Mcguire sounds to 28 Czech.  The bladder was unremarkable there was a diverticulum over the left orifice consistent with a Hutch diverticulum otherwise the examination was unremarkable.  I placed a 20 [place] IRA tip catheter to gravity drainage I will remove it in 24 hours    Procedure(s):  URETHRAL DILATATION, cystoscopy, strictures, martinez cath placement    Surgeon(s):  Carmine Julian MD    Anesthesia: see anesthesia record    Staff:   Circulator: Ami Wells RN  Scrub Person: Heather Ramírez LPN; Natasha Portillo    Estimated Blood Loss: none  Urine Voided: * No values recorded between 11/8/2021  9:20 AM and 11/8/2021  9:44 AM *    Specimens:                None      Drains: 20 Czech [place] IRA tip catheter    Findings: Urethral meatal stenosis and bulbomembranous stricture    Blood: N/A    Complications: None    Grafts and Implants: None    Carmine Julian MD     Date: 11/8/2021  Time: 09:44 EST

## 2021-11-08 NOTE — ANESTHESIA POSTPROCEDURE EVALUATION
Patient: Jc Graves    Procedure Summary     Date: 11/08/21 Room / Location: [facility] OR 06 /  [facility] OR    Anesthesia Start: 0920 Anesthesia Stop: 0944    Procedure: URETHRAL DILATATION, cystoscopy, strictures, martinez cath placement (N/A Urethra) Diagnosis:       Postprocedural bulbous urethral stricture      (Postprocedural bulbous urethral stricture [N99.111])    Surgeons: Carmine Julian MD Provider: Mayur Cabrera MD    Anesthesia Type: general ASA Status: 3          Anesthesia Type: general    Vitals  No vitals data found for the desired time range.          Post Anesthesia Care and Evaluation    Patient location during evaluation: PACU  Patient participation: complete - patient cannot participate  Level of consciousness: responsive to verbal stimuli  Pain score: 1  Pain management: adequate  Airway patency: patent  Anesthetic complications: No anesthetic complications  PONV Status: none  Cardiovascular status: hemodynamically stable  Respiratory status: nasal cannula  Hydration status: acceptable

## 2021-11-08 NOTE — H&P
• Urinary Frequency       cystoscopy          HPI:   70 y.o. male for review upper tract study was reviewed was negative.  He has significant frequency.  Lower tract investigation disclosed a tight stricture I will set him up for surgical intervention.        Past Medical History:         Medical History        Past Medical History:   Diagnosis Date   • Cancer (CMS/HCC)     • Hypertension     • Stroke (CMS/HCC)                 Current Meds:      Current Medications          Current Outpatient Medications   Medication Sig Dispense Refill   • apixaban (ELIQUIS) 5 MG tablet tablet Take 1 tablet by mouth 2 (Two) Times a Day. 60 tablet 0   • FLUoxetine (PROzac) 20 MG capsule Take 20 mg by mouth Daily.       • losartan-hydrochlorothiazide (HYZAAR) 100-25 MG per tablet Take 1 tablet by mouth Daily.       • simvastatin (ZOCOR) 20 MG tablet Take 1 tablet by mouth Every Night.          No current facility-administered medications for this visit.            Allergies:       No Known Allergies     Past Surgical History:      Surgical History         Past Surgical History:   Procedure Laterality Date   • COLONOSCOPY N/A 1/5/2018     Procedure: COLONOSCOPY FOR SCREENING;  Surgeon: Neeraj Monahan III, MD;  Location: [facility];  Service:    • SHOULDER ROTATOR CUFF REPAIR Left                 Social History:      Social History   Social History            Socioeconomic History   • Marital status:        Spouse name: Not on file   • Number of children: Not on file   • Years of education: Not on file   • Highest education level: Not on file   Tobacco Use   • Smoking status: Former Smoker       Packs/day: 1.00       Years: 40.00       Pack years: 40.00   • Smokeless tobacco: Never Used   Substance and Sexual Activity   • Alcohol use: No   • Drug use: No   • Sexual activity: Defer            Family History:            Family History   Problem Relation Age of Onset   • No Known Problems Father     • No Known Problems Mother            Review of Systems:      Review of Systems   Constitutional: Negative.    HENT: Negative.    Eyes: Negative.    Respiratory: Negative.    Cardiovascular: Negative.    Gastrointestinal: Negative.    Endocrine: Negative.    Genitourinary: Positive for difficulty urinating and frequency.   Musculoskeletal: Negative.    Allergic/Immunologic: Negative.    Neurological: Negative.    Hematological: Negative.    Psychiatric/Behavioral: Negative.          Physical Exam:      Physical Exam  Vitals and nursing note reviewed.   Constitutional:       Appearance: He is well-developed.   HENT:      Head: Normocephalic and atraumatic.   Eyes:      Conjunctiva/sclera: Conjunctivae normal.      Pupils: Pupils are equal, round, and reactive to light.   Cardiovascular:      Rate and Rhythm: Normal rate and regular rhythm.      Heart sounds: Normal heart sounds.   Pulmonary:      Effort: Pulmonary effort is normal.      Breath sounds: Normal breath sounds.   Abdominal:      General: Bowel sounds are normal.      Palpations: Abdomen is soft.   Genitourinary:     Penis: Normal.       Testes: Normal.   Musculoskeletal:         General: Normal range of motion.      Cervical back: Normal range of motion.   Skin:     General: Skin is warm and dry.   Neurological:      Mental Status: He is alert and oriented to person, place, and time.      Deep Tendon Reflexes: Reflexes are normal and symmetric.   Psychiatric:         Behavior: Behavior normal.         Thought Content: Thought content normal.         Judgment: Judgment normal.            I have reviewed the following portions of the patient's history: allergies, current medications, past family history, past medical history, past social history, past surgical history, problem list and ROS and confirm it's accurate.        Procedure:   Cystoscopy:  Patient presents today for cystourethroscopy.  I went ahead and obtained an informed consent including the risk of anesthesia, bleeding,  infection, etc.  After prep and drape in a sterile fashion in the low dorsal lithotomy position the urethra was gently anesthetized with 10 cc of 2% viscous Xylocaine jelly.  After an appropriate period of topical anesthesia I used the Olympus digital 14 Khmer flexible cystoscope to examine the anterior urethra which was tightly strictured at the bulbomembranous junction the patient was given 80 mg of gentamicin in an intramuscular fashion  as prophylaxis for the cystoscopy and released from the clinic.     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  Hematuria-patient was diagnosed with hematuria.  We discussed the significance of microscopic hematuria versus gross hematuria.  We discussed the presence or absence of the type of clotting identified including vermiform clots consistent with ureteral bleeding versus just pink tinged urine versus maurice clots.  We discussed the presence of urokinase in the urine which causes the clots to dissolve with time.  We discussed the fact that it takes only a very small amount of blood in the urine to make the urine very red appearing and therefore give one the impression that there is much more blood loss that is really present.  He discussed the use of both an upper and lower tract investigation.  I discussed the fact that an upper tract investigation includes a normal renal ultrasound with a significant risks of missing more subtle lesions.  Progressing to a CT scan without contrast and finally the CT scan with contrast being the gold standard to diagnose the small neoplasms.  We discussed the lower tract investigation consisting of a cystoscopy in many of the cases where the upper tract study is negative.  Also discussed the fact that if there is a contraindication to the use of contrast we would do a noncontrasted study and this also has a chance of missing small lesions.  The specific instance would be cases of  diabetes and chronic renal insufficiency.  Discussed the fact that there is about a 96% chance of a negative workup with episodes of microscopic hematuria and with much greater in the face of gross hematuria.  We discussed the fact that this is a non-cumulative test.  In other words if there is hematuria next year I would recommend continuing to work up the condition because of the fact that neoplasms may be small at the first workup and easily are missed.  I discussed the differential diagnosis of hematuria including trauma, neoplasia, infection, etc.  We discussed the fact that if there is any history of chronic kidney disease or risk factors such as diabetes for contrast a noncontrasted study will be utilized.  We will initiate an investigation.  Upper and lower tract investigation negative thus far but I did not get a good view of the bladder mucosa this we done at the time of surgery

## 2021-11-09 ENCOUNTER — OFFICE VISIT (OUTPATIENT)
Dept: UROLOGY | Facility: CLINIC | Age: 71
End: 2021-11-09

## 2021-11-09 VITALS — BODY MASS INDEX: 26.28 KG/M2 | HEIGHT: 72 IN | WEIGHT: 194 LBS

## 2021-11-09 DIAGNOSIS — N99.111 POSTPROCEDURAL BULBOUS URETHRAL STRICTURE: Primary | ICD-10-CM

## 2021-11-09 DIAGNOSIS — N30.00 ACUTE CYSTITIS WITHOUT HEMATURIA: ICD-10-CM

## 2021-11-09 PROCEDURE — 99213 OFFICE O/P EST LOW 20 MIN: CPT | Performed by: UROLOGY

## 2021-11-09 NOTE — PROGRESS NOTES
Chief Complaint:          Chief Complaint   Patient presents with   • Stricture     surgery fu        HPI:   70 y.o. male is today status post dilation of meatal stenosis and a tight bulbomembranous stricture and a catheter was placed he called yesterday having some blood around the catheter but he was on his blood thinner.  Today the blood has resolved the urine is clear the catheter was removed I will see him back in a week I will follow up with him based on this.  This should take care of his voiding dysfunction      Past Medical History:        Past Medical History:   Diagnosis Date   • Atrial fibrillation (HCC)    • Dry skin dermatitis    • Elevated cholesterol    • Hypertension    • Stroke (HCC) 2009    TIA         Current Meds:     Current Outpatient Medications   Medication Sig Dispense Refill   • apixaban (ELIQUIS) 5 MG tablet tablet Take 1 tablet by mouth 2 (Two) Times a Day. 60 tablet 0   • FLUoxetine (PROzac) 20 MG capsule Take 20 mg by mouth Daily.     • HYDROcodone-acetaminophen (NORCO)  MG per tablet Take 1 tablet by mouth Every 4 (Four) Hours As Needed for Moderate Pain  (Pain). 12 tablet 0   • losartan-hydrochlorothiazide (HYZAAR) 100-25 MG per tablet Take 1 tablet by mouth Daily.     • simvastatin (ZOCOR) 20 MG tablet Take 1 tablet by mouth Every Night.       No current facility-administered medications for this visit.        Allergies:      No Known Allergies     Past Surgical History:     Past Surgical History:   Procedure Laterality Date   • COLONOSCOPY N/A 1/5/2018    Procedure: COLONOSCOPY FOR SCREENING;  Surgeon: Neeraj Monahan III, MD;  Location: [facility];  Service:    • SHOULDER ROTATOR CUFF REPAIR Left    • URETHRAL DILATATION N/A 11/8/2021    Procedure: URETHRAL DILATATION, cystoscopy, strictures, martinez cath placement;  Surgeon: Carmine Julian MD;  Location: [facility];  Service: Urology;  Laterality: N/A;         Social History:     Social History     Socioeconomic  History   • Marital status:    Tobacco Use   • Smoking status: Former Smoker     Packs/day: 1.00     Years: 40.00     Pack years: 40.00     Quit date:      Years since quittin.8   • Smokeless tobacco: Never Used   Vaping Use   • Vaping Use: Never used   Substance and Sexual Activity   • Alcohol use: No   • Drug use: No   • Sexual activity: Defer       Family History:     Family History   Problem Relation Age of Onset   • No Known Problems Father    • No Known Problems Mother        Review of Systems:     Review of Systems   Constitutional: Negative.    HENT: Negative.    Eyes: Negative.    Respiratory: Negative.    Cardiovascular: Negative.    Gastrointestinal: Negative.    Endocrine: Negative.    Genitourinary: Positive for difficulty urinating and hematuria.   Musculoskeletal: Negative.    Allergic/Immunologic: Negative.    Neurological: Negative.    Hematological: Negative.    Psychiatric/Behavioral: Negative.        Physical Exam:     Physical Exam  Vitals and nursing note reviewed.   Constitutional:       Appearance: He is well-developed.   HENT:      Head: Normocephalic and atraumatic.   Eyes:      Conjunctiva/sclera: Conjunctivae normal.      Pupils: Pupils are equal, round, and reactive to light.   Cardiovascular:      Rate and Rhythm: Normal rate and regular rhythm.      Heart sounds: Normal heart sounds.   Pulmonary:      Effort: Pulmonary effort is normal.      Breath sounds: Normal breath sounds.   Abdominal:      General: Bowel sounds are normal.      Palpations: Abdomen is soft.   Musculoskeletal:         General: Normal range of motion.      Cervical back: Normal range of motion.   Skin:     General: Skin is warm and dry.   Neurological:      Mental Status: He is alert and oriented to person, place, and time.      Deep Tendon Reflexes: Reflexes are normal and symmetric.   Psychiatric:         Behavior: Behavior normal.         Thought Content: Thought content normal.         Judgment:  Judgment normal.         I have reviewed the following portions of the patient's history: Allergies, current medications, past family history, past medical history, past social history, past surgical history, problem list, and ROS and confirm it is accurate.      Procedure:       Assessment/Plan:   Urethral stricture-status post dilation of both the distal and proximal stricture without complication.  He is can go back up on his blood thinner the catheter is on I will see him back in a week                This document has been electronically signed by JOBY GILL MD November 9, 2021 10:07 EST

## 2021-11-16 ENCOUNTER — OFFICE VISIT (OUTPATIENT)
Dept: UROLOGY | Facility: CLINIC | Age: 71
End: 2021-11-16

## 2021-11-16 VITALS — HEIGHT: 72 IN | BODY MASS INDEX: 26.28 KG/M2 | WEIGHT: 194 LBS

## 2021-11-16 DIAGNOSIS — N99.111 POSTPROCEDURAL BULBOUS URETHRAL STRICTURE: Primary | ICD-10-CM

## 2021-11-16 PROCEDURE — 99213 OFFICE O/P EST LOW 20 MIN: CPT | Performed by: UROLOGY

## 2022-02-09 ENCOUNTER — TELEPHONE (OUTPATIENT)
Dept: UROLOGY | Facility: CLINIC | Age: 72
End: 2022-02-09

## 2022-02-09 DIAGNOSIS — N99.111 POSTPROCEDURAL BULBOUS URETHRAL STRICTURE: Primary | ICD-10-CM

## 2022-02-09 NOTE — TELEPHONE ENCOUNTER
Caller: Jc Graves    Relationship: Self    Best call back number: [phone]    What is the best time to reach you: ANY    Who are you requesting to speak with clinical staff OR provider,     Do you know the name of the person who called: JC GRAVES    What was the call regarding:   PT WANTS TO GET PUT BACK ON RX CALLED TRIAMCINOLONE 0.1%    Do you require a callback: YES

## 2022-02-11 RX ORDER — TRIAMCINOLONE ACETONIDE 1 MG/G
1 CREAM TOPICAL 2 TIMES DAILY PRN
Qty: 15 G | Refills: 4 | Status: SHIPPED | OUTPATIENT
Start: 2022-02-11

## 2022-05-24 ENCOUNTER — OFFICE VISIT (OUTPATIENT)
Dept: UROLOGY | Facility: CLINIC | Age: 72
End: 2022-05-24

## 2022-05-24 VITALS — WEIGHT: 194 LBS | HEIGHT: 72 IN | BODY MASS INDEX: 26.28 KG/M2

## 2022-05-24 DIAGNOSIS — N99.111 POSTPROCEDURAL BULBOUS URETHRAL STRICTURE: Primary | ICD-10-CM

## 2022-05-24 PROCEDURE — 99213 OFFICE O/P EST LOW 20 MIN: CPT | Performed by: UROLOGY

## 2022-05-26 NOTE — PROGRESS NOTES
Chief Complaint:          Chief Complaint   Patient presents with   • Follow-up     Urethral structure    • Foot Swelling     Left foot. PT went to ER said it was gout. Currently taking colchicine with improvement        HPI:   71 y.o. male who is status post urethral dilatation is now been 6 months his stream is great he feels great he has a flareup of his gout doing well I will see him back on a as needed basis.      Past Medical History:        Past Medical History:   Diagnosis Date   • Atrial fibrillation (HCC)    • Dry skin dermatitis    • Elevated cholesterol    • Hypertension    • Stroke (HCC) 2009    TIA         Current Meds:     Current Outpatient Medications   Medication Sig Dispense Refill   • apixaban (ELIQUIS) 5 MG tablet tablet Take 1 tablet by mouth 2 (Two) Times a Day. 60 tablet 0   • FLUoxetine (PROzac) 20 MG capsule Take 20 mg by mouth Daily.     • HYDROcodone-acetaminophen (NORCO)  MG per tablet Take 1 tablet by mouth Every 4 (Four) Hours As Needed for Moderate Pain  (Pain). 12 tablet 0   • losartan-hydrochlorothiazide (HYZAAR) 100-25 MG per tablet Take 1 tablet by mouth Daily.     • simvastatin (ZOCOR) 20 MG tablet Take 1 tablet by mouth Every Night.     • triamcinolone (KENALOG) 0.1 % cream Apply 1 application topically to the appropriate area as directed 2 (Two) Times a Day As Needed for Irritation. 15 g 4     No current facility-administered medications for this visit.        Allergies:      No Known Allergies     Past Surgical History:     Past Surgical History:   Procedure Laterality Date   • COLONOSCOPY N/A 1/5/2018    Procedure: COLONOSCOPY FOR SCREENING;  Surgeon: Neeraj Monahan III, MD;  Location: [facility];  Service:    • SHOULDER ROTATOR CUFF REPAIR Left    • URETHRAL DILATATION N/A 11/8/2021    Procedure: URETHRAL DILATATION, cystoscopy, strictures, martinez cath placement;  Surgeon: Carmine Julian MD;  Location: [facility];  Service: Urology;  Laterality: N/A;          Social History:     Social History     Socioeconomic History   • Marital status:    Tobacco Use   • Smoking status: Former Smoker     Packs/day: 1.00     Years: 40.00     Pack years: 40.00     Quit date:      Years since quittin.4   • Smokeless tobacco: Never Used   Vaping Use   • Vaping Use: Never used   Substance and Sexual Activity   • Alcohol use: No   • Drug use: No   • Sexual activity: Defer       Family History:     Family History   Problem Relation Age of Onset   • No Known Problems Father    • No Known Problems Mother        Review of Systems:     Review of Systems   Constitutional: Negative.    HENT: Negative.    Eyes: Negative.    Respiratory: Negative.    Cardiovascular: Negative.    Gastrointestinal: Negative.    Endocrine: Negative.    Musculoskeletal: Negative.    Allergic/Immunologic: Negative.    Neurological: Negative.    Hematological: Negative.    Psychiatric/Behavioral: Negative.        Physical Exam:     Physical Exam  Vitals and nursing note reviewed.   Constitutional:       Appearance: He is well-developed.   HENT:      Head: Normocephalic and atraumatic.   Eyes:      Conjunctiva/sclera: Conjunctivae normal.      Pupils: Pupils are equal, round, and reactive to light.   Cardiovascular:      Rate and Rhythm: Normal rate and regular rhythm.      Heart sounds: Normal heart sounds.   Pulmonary:      Effort: Pulmonary effort is normal.      Breath sounds: Normal breath sounds.   Abdominal:      General: Bowel sounds are normal.      Palpations: Abdomen is soft.   Musculoskeletal:         General: Normal range of motion.      Cervical back: Normal range of motion.   Skin:     General: Skin is warm and dry.   Neurological:      Mental Status: He is alert and oriented to person, place, and time.      Deep Tendon Reflexes: Reflexes are normal and symmetric.   Psychiatric:         Behavior: Behavior normal.         Thought Content: Thought content normal.         Judgment:  Judgment normal.         I have reviewed the following portions of the patient's history: Allergies, current medications, past family history, past medical history, past social history, past surgical history, problem list, and ROS and confirm it is accurate.      Procedure:       Assessment/Plan:   Posttraumatic urethral stricture-status post dilation doing great we will plan to see him back on an as needed basis                This document has been electronically signed by JOBY GILL MD May 26, 2022 05:47 EDT

## 2023-08-10 ENCOUNTER — OFFICE VISIT (OUTPATIENT)
Dept: UROLOGY | Facility: CLINIC | Age: 73
End: 2023-08-10
Payer: MEDICARE

## 2023-08-10 VITALS
SYSTOLIC BLOOD PRESSURE: 123 MMHG | BODY MASS INDEX: 25.33 KG/M2 | HEIGHT: 72 IN | DIASTOLIC BLOOD PRESSURE: 73 MMHG | HEART RATE: 54 BPM | WEIGHT: 187 LBS

## 2023-08-10 DIAGNOSIS — N39.0 URINARY TRACT INFECTION WITH HEMATURIA, SITE UNSPECIFIED: ICD-10-CM

## 2023-08-10 DIAGNOSIS — N99.111 POSTPROCEDURAL BULBOUS URETHRAL STRICTURE: ICD-10-CM

## 2023-08-10 DIAGNOSIS — N30.00 ACUTE CYSTITIS WITHOUT HEMATURIA: Primary | ICD-10-CM

## 2023-08-10 DIAGNOSIS — R31.9 URINARY TRACT INFECTION WITH HEMATURIA, SITE UNSPECIFIED: ICD-10-CM

## 2023-08-10 DIAGNOSIS — N32.3: ICD-10-CM

## 2023-08-10 LAB
BILIRUB BLD-MCNC: NEGATIVE MG/DL
CLARITY, POC: CLEAR
COLOR UR: ABNORMAL
EXPIRATION DATE: ABNORMAL
GLUCOSE UR STRIP-MCNC: NEGATIVE MG/DL
KETONES UR QL: NEGATIVE
LEUKOCYTE EST, POC: ABNORMAL
Lab: ABNORMAL
NITRITE UR-MCNC: NEGATIVE MG/ML
PH UR: 6 [PH] (ref 5–8)
PROT UR STRIP-MCNC: NEGATIVE MG/DL
RBC # UR STRIP: ABNORMAL /UL
SP GR UR: 1.03 (ref 1–1.03)
UROBILINOGEN UR QL: NORMAL

## 2023-08-10 PROCEDURE — 87147 CULTURE TYPE IMMUNOLOGIC: CPT

## 2023-08-10 PROCEDURE — 87186 SC STD MICRODIL/AGAR DIL: CPT

## 2023-08-10 PROCEDURE — 87086 URINE CULTURE/COLONY COUNT: CPT

## 2023-08-10 RX ORDER — ALLOPURINOL 100 MG/1
100 TABLET ORAL
COMMUNITY

## 2023-08-10 NOTE — PROGRESS NOTES
"Chief Complaint:    Chief Complaint   Patient presents with    Blood in Urine       Vital Signs:   /73 (BP Location: Right arm, Patient Position: Sitting)   Pulse 54   Ht 182.9 cm (72\")   Wt 84.8 kg (187 lb)   BMI 25.36 kg/mý   Body mass index is 25.36 kg/mý.      HPI:  Jc Graves is a 72 y.o. male who presents today for follow up    History of Present Illness  Mr. Martinez presents to the clinic for concerns of dark brown to red urine.  He has a past medical history significant for A-fib, and TIA for which he currently takes Eliquis 5 mg twice daily.  He was last seen in office by Dr. Julian in May 2022 for follow-up of urethral dilation and cystoscopy in the OR setting for a bulbous urethral stricture.  Cystoscopy was tried to be completed in outpatient setting however he was unable to pass the scope successfully into the bladder.  He was dilated to a 24 Telugu.  He then visualized his Hutch bladder diverticuli with no other abnormalities.  Patient reports that since last office visit he has not had any significant increase in difficulty urinating, weak urinary stream, split stream, decreased urinary output, or pelvic pain/pressure.  He denies any fever, chills, perineum pain, testicular pain, penile pain, or low back pain.  UA today shows 3+ blood and and 3+ leukocytes.  I will send off for culture.    Past Medical History:  Past Medical History:   Diagnosis Date    Atrial fibrillation     Dry skin dermatitis     Elevated cholesterol     Hypertension     Stroke 2009    TIA       Current Meds:  Current Outpatient Medications   Medication Sig Dispense Refill    allopurinol (ZYLOPRIM) 100 MG tablet 1 tablet.      apixaban (ELIQUIS) 5 MG tablet tablet Take 1 tablet by mouth 2 (Two) Times a Day. 60 tablet 0    FLUoxetine (PROzac) 20 MG capsule Take 1 capsule by mouth Daily.      losartan-hydrochlorothiazide (HYZAAR) 100-25 MG per tablet Take 1 tablet by mouth Daily.      simvastatin (ZOCOR) 20 MG tablet " Take 1 tablet by mouth Every Night.      HYDROcodone-acetaminophen (NORCO)  MG per tablet Take 1 tablet by mouth Every 4 (Four) Hours As Needed for Moderate Pain  (Pain). (Patient not taking: Reported on 8/10/2023) 12 tablet 0    triamcinolone (KENALOG) 0.1 % cream Apply 1 application topically to the appropriate area as directed 2 (Two) Times a Day As Needed for Irritation. (Patient not taking: Reported on 8/10/2023) 15 g 4     No current facility-administered medications for this visit.        Allergies:   No Known Allergies     Past Surgical History:  Past Surgical History:   Procedure Laterality Date    COLONOSCOPY N/A 2018    Procedure: COLONOSCOPY FOR SCREENING;  Surgeon: Neeraj Monahan III, MD;  Location: [facility];  Service:     SHOULDER ROTATOR CUFF REPAIR Left     URETHRAL DILATATION N/A 2021    Procedure: URETHRAL DILATATION, cystoscopy, strictures, martinez cath placement;  Surgeon: Carmine Julian MD;  Location: [facility];  Service: Urology;  Laterality: N/A;       Social History:  Social History     Socioeconomic History    Marital status:    Tobacco Use    Smoking status: Former     Packs/day: 1.00     Years: 40.00     Pack years: 40.00     Types: Cigarettes     Quit date:      Years since quittin.6    Smokeless tobacco: Never   Vaping Use    Vaping Use: Never used   Substance and Sexual Activity    Alcohol use: No    Drug use: No    Sexual activity: Defer       Family History:  Family History   Problem Relation Age of Onset    No Known Problems Father     No Known Problems Mother        Review of Systems:  Review of Systems   Constitutional:  Negative for chills, fatigue, fever and unexpected weight change.   Respiratory:  Negative for cough, chest tightness, shortness of breath and wheezing.    Cardiovascular:  Negative for chest pain.   Gastrointestinal:  Negative for abdominal pain, constipation, diarrhea, nausea and vomiting.   Genitourinary:  Positive for  hematuria. Negative for difficulty urinating, dysuria, frequency, penile swelling, scrotal swelling, testicular pain and urgency.   Musculoskeletal:  Negative for back pain and joint swelling.   Skin:  Negative for rash.   Neurological:  Negative for dizziness and headaches.   Psychiatric/Behavioral:  Negative for confusion and suicidal ideas.      Physical Exam:  Physical Exam  Constitutional:       General: He is not in acute distress.     Appearance: Normal appearance.   HENT:      Head: Normocephalic and atraumatic.      Nose: Nose normal.      Mouth/Throat:      Mouth: Mucous membranes are moist.   Eyes:      Conjunctiva/sclera: Conjunctivae normal.   Cardiovascular:      Rate and Rhythm: Normal rate and regular rhythm.      Pulses: Normal pulses.      Heart sounds: Normal heart sounds.   Pulmonary:      Effort: Pulmonary effort is normal.      Breath sounds: Normal breath sounds.   Abdominal:      General: Bowel sounds are normal.      Palpations: Abdomen is soft.   Genitourinary:     Penis: Normal.    Musculoskeletal:         General: Normal range of motion.      Cervical back: Normal range of motion.   Skin:     General: Skin is warm.   Neurological:      General: No focal deficit present.      Mental Status: He is alert and oriented to person, place, and time.   Psychiatric:         Mood and Affect: Mood normal.         Behavior: Behavior normal.         Thought Content: Thought content normal.         Judgment: Judgment normal.     Recent Image (CT and/or KUB):   CT Abdomen and Pelvis: Results for orders placed during the hospital encounter of 08/18/21    CT Abdomen Pelvis With & Without Contrast    Narrative  EXAM:  CT Abdomen and Pelvis Without and With Intravenous Contrast    EXAM DATE:  8/18/2021 8:30 AM    CLINICAL HISTORY:  Hematuria, unknown cause; R31.0-Gross hematuria; N30.01-Acute cystitis  with hematuria    TECHNIQUE:  Axial computed tomography images of the abdomen and pelvis without and  with  intravenous contrast.  Sagittal and coronal reformatted images were  created and reviewed.  This CT exam was performed using one or more of  the following dose reduction techniques:  automated exposure control,  adjustment of the mA and/or kV according to patient size, and/or use of  iterative reconstruction technique.    COMPARISON:  No relevant prior studies available.    FINDINGS:  LUNG BASES:  Unremarkable.  No mass.  No consolidation.    ABDOMEN:  LIVER:  Unremarkable.  No mass.  GALLBLADDER AND BILE DUCTS:  Unremarkable.  No calcified stones.  No  ductal dilation.  PANCREAS:  Unremarkable.  No mass.  No ductal dilation.  SPLEEN:  Unremarkable.  No splenomegaly.  ADRENALS:  Unremarkable.  No mass.  KIDNEYS AND URETERS:  Right renal cyst measuring 1.7 cm.  No  obstructing stones.  No hydronephrosis.  STOMACH AND BOWEL:  Unremarkable.  No obstruction.  No mucosal  thickening.    PELVIS:  APPENDIX:  No findings to suggest acute appendicitis.  BLADDER:  Urinary bladder wall thickening that could be due to outlet  obstruction.  Left urinary bladder posterior wall diverticulum measuring  up to 3 cm.  No stones.  REPRODUCTIVE:  Mild prostate enlargement with prostate calcifications  noted.    ABDOMEN and PELVIS:  INTRAPERITONEAL SPACE:  Unremarkable.  No free air.  No significant  fluid collection.  BONES/JOINTS:  No acute fracture.  No dislocation.  SOFT TISSUES:  Unremarkable.  VASCULATURE:  Atherosclerotic vascular calcification.  No abdominal  aortic aneurysm.  LYMPH NODES:  Unremarkable.  No enlarged lymph nodes.    Impression  1.  Urinary bladder wall thickening that could be due to outlet  obstruction.  2.  Left urinary bladder posterior wall diverticulum measuring up to 3  cm.  3.  Mild prostate enlargement with prostate calcifications noted.    This report was finalized on 8/18/2021 9:17 AM by Dr. Nigel Sun MD.     CT Stone Protocol: No results found for this or any previous visit.     KUB: No results  found for this or any previous visit.       Labs:  Brief Urine Lab Results  (Last result in the past 365 days)        Color   Clarity   Blood   Leuk Est   Nitrite   Protein   CREAT   Urine HCG        08/10/23 0958 Red   Clear   3+   Large (3+)   Negative   Negative                 Office Visit on 08/10/2023   Component Date Value Ref Range Status    Color 08/10/2023 Red (A)  Yellow, Straw, Dark Yellow, Sarah Final    Clarity, UA 08/10/2023 Clear  Clear Final    Specific Gravity  08/10/2023 1.030  1.005 - 1.030 Final    pH, Urine 08/10/2023 6.0  5.0 - 8.0 Final    Leukocytes 08/10/2023 Large (3+) (A)  Negative Final    Nitrite, UA 08/10/2023 Negative  Negative Final    Protein, POC 08/10/2023 Negative  Negative mg/dL Final    Glucose, UA 08/10/2023 Negative  Negative mg/dL Final    Ketones, UA 08/10/2023 Negative  Negative Final    Urobilinogen, UA 08/10/2023 Normal  Normal, 0.2 E.U./dL Final    Bilirubin 08/10/2023 Negative  Negative Final    Blood, UA 08/10/2023 3+ (A)  Negative Final    Lot Number 08/10/2023 [id number]   Final    Expiration Date 08/10/2023 102,024   Final        Procedure: None  Procedures     I have reviewed and agree with the above PMH, PSH, FMH, social history, medications, allergies, and labs.     Assessment/Plan:   Problem List Items Addressed This Visit          Genitourinary and Reproductive     Acute cystitis - Primary    Postprocedural bulbous urethral stricture    Diverticula, bladder acquired     Other Visit Diagnoses       Urinary tract infection with hematuria, site unspecified        Relevant Orders    POC Urinalysis Dipstick, Automated (Completed)    Urine Culture - Urine, Urine, Clean Catch            Health Maintenance:   Health Maintenance Due   Topic Date Due    ZOSTER VACCINE (1 of 2) Never done    LUNG CANCER SCREENING  Never done    TDAP/TD VACCINES (2 - Td or Tdap) 11/07/2016    HEPATITIS C SCREENING  Never done    ANNUAL WELLNESS VISIT  Never done    Pneumococcal  Vaccine 65+ (1 - PCV) Never done    COVID-19 Vaccine (4 - Pfizer series) 10/29/2021    LIPID PANEL  11/05/2021        Smoking Counseling: Former smoker.  Never used smokeless tobacco.    Urine Incontinence: Patient reports that he is not currently experiencing any symptoms of urinary incontinence.    Patient was given instructions and counseling regarding his condition or for health maintenance advice. Please see specific information pulled into the AVS if appropriate.    Patient Education:   Acute cystitis/UTI -patient has concern for gross hematuria and has notable 3+ blood and 3+ leukocytes on UA today.  I will send the patient's urine off for culture analysis.  Given limited other urinary tract symptoms I will will hold off antibiotics until culture results are obtained.  Advised patient that if symptoms worsen to call and I will send in antibiotics prophylactically.  Advised patient to increase water intake to 2 to 3 L/day.  Will have him follow-up in 1 month to repeat a UA in office.  Bladder diverticulum -I discussed with the patient the pathophysiology of bladder diverticuli.  I discussed classifications and treatment which can include observation.  Advised patient that with persistent gross or microscopic hematuria there is increased risk for possible blood clot/bladder mass inside the diverticuli.  Did discuss the use of repeat CT with for further imaging as well as cystoscopy in office or in the OR setting.  We will retest a UA in 1 month.  Urethral stricture -  patient has a history of urethral dilation due to bulbomembranous urethral stricture.  I did advise patient that urethral strictures can recur and he should seek prompt evaluation either at the ER or in office he begins to experience an increase in difficulty urinating, decreased urinary output, weak urinary stream, split stream, or other concerning neurological symptoms.  Did discuss the use of repeat urethral dilation in the OR setting.  He  would like to hold off at this time.  I will call him with culture results and see him back in 1 month for repeat UA.  Patient verbalized understanding and agreed to plan of care.    Visit Diagnoses:    ICD-10-CM ICD-9-CM   1. Acute cystitis without hematuria  N30.00 595.0   2. Urinary tract infection with hematuria, site unspecified  N39.0 599.0    R31.9 599.70   3. Postprocedural bulbous urethral stricture  N99.111 598.2   4. Diverticula, bladder acquired  N32.3 596.3       Meds Ordered During Visit:  No orders of the defined types were placed in this encounter.      Follow Up Appointment: 1 month  No follow-ups on file.      This document has been electronically signed by Ciro Marquis PA-C   August 10, 2023 13:00 EDT    Part of this note may be an electronic transcription/translation of spoken language to printed text using the Dragon Dictation System.   Burow's Graft Text: The defect edges were debeveled with a #15 scalpel blade.  Given the location of the defect, shape of the defect, the proximity to free margins and the presence of a standing cone deformity a Burow's skin graft was deemed most appropriate. The standing cone was removed and this tissue was then trimmed to the shape of the primary defect. The adipose tissue was also removed until only dermis and epidermis were left.  The skin margins of the secondary defect were undermined to an appropriate distance in all directions utilizing iris scissors.  The secondary defect was closed with interrupted buried subcutaneous sutures.  The skin edges were then re-apposed with running  sutures.  The skin graft was then placed in the primary defect and oriented appropriately.

## 2023-08-12 LAB — BACTERIA SPEC AEROBE CULT: ABNORMAL

## 2023-08-14 ENCOUNTER — TELEPHONE (OUTPATIENT)
Dept: UROLOGY | Facility: CLINIC | Age: 73
End: 2023-08-14
Payer: MEDICARE

## 2023-08-14 DIAGNOSIS — N39.0 URINARY TRACT INFECTION WITH HEMATURIA, SITE UNSPECIFIED: Primary | ICD-10-CM

## 2023-08-14 DIAGNOSIS — R31.9 URINARY TRACT INFECTION WITH HEMATURIA, SITE UNSPECIFIED: Primary | ICD-10-CM

## 2023-08-14 RX ORDER — CLINDAMYCIN HYDROCHLORIDE 300 MG/1
300 CAPSULE ORAL 2 TIMES DAILY
Qty: 10 CAPSULE | Refills: 0 | Status: SHIPPED | OUTPATIENT
Start: 2023-08-14 | End: 2023-08-19

## 2023-08-14 NOTE — TELEPHONE ENCOUNTER
Called patient to discuss culture results.  Verbalized understanding I will send in clindamycin 300 mg twice daily for 5 days.

## 2023-09-19 ENCOUNTER — OFFICE VISIT (OUTPATIENT)
Dept: UROLOGY | Facility: CLINIC | Age: 73
End: 2023-09-19
Payer: MEDICARE

## 2023-09-19 VITALS
HEIGHT: 72 IN | DIASTOLIC BLOOD PRESSURE: 85 MMHG | BODY MASS INDEX: 26.01 KG/M2 | HEART RATE: 57 BPM | WEIGHT: 192 LBS | SYSTOLIC BLOOD PRESSURE: 139 MMHG

## 2023-09-19 DIAGNOSIS — N30.00 ACUTE CYSTITIS WITHOUT HEMATURIA: ICD-10-CM

## 2023-09-19 DIAGNOSIS — N39.0 URINARY TRACT INFECTION WITH HEMATURIA, SITE UNSPECIFIED: Primary | ICD-10-CM

## 2023-09-19 DIAGNOSIS — R31.9 URINARY TRACT INFECTION WITH HEMATURIA, SITE UNSPECIFIED: Primary | ICD-10-CM

## 2023-09-19 LAB
BILIRUB BLD-MCNC: NEGATIVE MG/DL
CLARITY, POC: CLEAR
COLOR UR: YELLOW
EXPIRATION DATE: ABNORMAL
GLUCOSE UR STRIP-MCNC: NEGATIVE MG/DL
KETONES UR QL: NEGATIVE
LEUKOCYTE EST, POC: ABNORMAL
Lab: ABNORMAL
NITRITE UR-MCNC: NEGATIVE MG/ML
PH UR: 5.5 [PH] (ref 5–8)
PROT UR STRIP-MCNC: ABNORMAL MG/DL
RBC # UR STRIP: ABNORMAL /UL
SP GR UR: 1.01 (ref 1–1.03)
UROBILINOGEN UR QL: NORMAL

## 2023-09-19 PROCEDURE — 87086 URINE CULTURE/COLONY COUNT: CPT

## 2023-09-19 RX ORDER — COLCHICINE 0.6 MG/1
TABLET ORAL
COMMUNITY
Start: 2023-08-08

## 2023-09-19 NOTE — PROGRESS NOTES
"Chief Complaint:    Chief Complaint   Patient presents with    Cystitis       Vital Signs:   /85 (BP Location: Left arm, Patient Position: Sitting, Cuff Size: Adult)   Pulse 57   Ht 182.9 cm (72.01\")   Wt 87.1 kg (192 lb)   BMI 26.03 kg/m²   Body mass index is 26.03 kg/m².      HPI:  Jc Graves is a 72 y.o. male who presents today for follow up    History of Present Illness  Mr. Graves presents to the clinic for follow-up for acute cystitis with hematuria.  Patient had a urine culture completed 1 month ago that showed >100,000 CFU/mL Staphylococcus epidermidis.  He was started on clindamycin twice daily for 5 days.  He denies any symptoms since starting medications.  This includes not limited to fever, chills, dysuria, weak urinary stream, decreased urinary output, pressure or straining begin urine nation, gross hematuria, or penile pain.  Patient UA today shows trace leukocytes and 3+ blood.  I will send this back off for culture.  Otherwise he is doing well and I will call with results.    Past Medical History:  Past Medical History:   Diagnosis Date    Atrial fibrillation     Dry skin dermatitis     Elevated cholesterol     Hypertension     Stroke 2009    TIA       Current Meds:  Current Outpatient Medications   Medication Sig Dispense Refill    allopurinol (ZYLOPRIM) 100 MG tablet 1 tablet.      apixaban (ELIQUIS) 5 MG tablet tablet Take 1 tablet by mouth 2 (Two) Times a Day. 60 tablet 0    colchicine 0.6 MG tablet TAKE ONE TABLET BY MOUTH EVERY DAY as needed for gout prevention      FLUoxetine (PROzac) 20 MG capsule Take 1 capsule by mouth Daily.      HYDROcodone-acetaminophen (NORCO)  MG per tablet Take 1 tablet by mouth Every 4 (Four) Hours As Needed for Moderate Pain  (Pain). 12 tablet 0    losartan-hydrochlorothiazide (HYZAAR) 100-25 MG per tablet Take 1 tablet by mouth Daily.      triamcinolone (KENALOG) 0.1 % cream Apply 1 application topically to the appropriate area as directed 2 " (Two) Times a Day As Needed for Irritation. 15 g 4     No current facility-administered medications for this visit.        Allergies:   No Known Allergies     Past Surgical History:  Past Surgical History:   Procedure Laterality Date    COLONOSCOPY N/A 2018    Procedure: COLONOSCOPY FOR SCREENING;  Surgeon: Neeraj Monahan III, MD;  Location: [facility];  Service:     SHOULDER ROTATOR CUFF REPAIR Left     URETHRAL DILATATION N/A 2021    Procedure: URETHRAL DILATATION, cystoscopy, strictures, martinez cath placement;  Surgeon: Carmine Julian MD;  Location: [facility];  Service: Urology;  Laterality: N/A;       Social History:  Social History     Socioeconomic History    Marital status:    Tobacco Use    Smoking status: Former     Packs/day: 1.00     Years: 40.00     Pack years: 40.00     Types: Cigarettes     Quit date:      Years since quittin.7    Smokeless tobacco: Never   Vaping Use    Vaping Use: Never used   Substance and Sexual Activity    Alcohol use: No    Drug use: No    Sexual activity: Defer       Family History:  Family History   Problem Relation Age of Onset    No Known Problems Father     No Known Problems Mother        Review of Systems:  Review of Systems   Constitutional:  Negative for chills, fatigue, fever and unexpected weight change.   Respiratory:  Negative for cough, chest tightness, shortness of breath and wheezing.    Cardiovascular:  Negative for chest pain.   Gastrointestinal:  Negative for abdominal pain, constipation, diarrhea, nausea and vomiting.   Genitourinary:  Negative for difficulty urinating, dysuria, frequency, hematuria, penile swelling, scrotal swelling, testicular pain and urgency.   Musculoskeletal:  Negative for back pain and joint swelling.   Skin:  Negative for rash.   Neurological:  Negative for dizziness and headaches.   Psychiatric/Behavioral:  Negative for confusion and suicidal ideas.      Physical Exam:  Physical  Exam  Constitutional:       General: He is not in acute distress.     Appearance: Normal appearance.   HENT:      Head: Normocephalic and atraumatic.      Nose: Nose normal.      Mouth/Throat:      Mouth: Mucous membranes are moist.   Eyes:      Conjunctiva/sclera: Conjunctivae normal.   Cardiovascular:      Rate and Rhythm: Normal rate and regular rhythm.      Pulses: Normal pulses.      Heart sounds: Normal heart sounds.   Pulmonary:      Effort: Pulmonary effort is normal.      Breath sounds: Normal breath sounds.   Abdominal:      General: Bowel sounds are normal.      Palpations: Abdomen is soft.   Musculoskeletal:         General: Normal range of motion.      Cervical back: Normal range of motion.   Skin:     General: Skin is warm.   Neurological:      General: No focal deficit present.      Mental Status: He is alert and oriented to person, place, and time.   Psychiatric:         Mood and Affect: Mood normal.         Behavior: Behavior normal.         Thought Content: Thought content normal.         Judgment: Judgment normal.       Recent Image (CT and/or KUB):   CT Abdomen and Pelvis: Results for orders placed during the hospital encounter of 08/18/21    CT Abdomen Pelvis With & Without Contrast    Narrative  EXAM:  CT Abdomen and Pelvis Without and With Intravenous Contrast    EXAM DATE:  8/18/2021 8:30 AM    CLINICAL HISTORY:  Hematuria, unknown cause; R31.0-Gross hematuria; N30.01-Acute cystitis  with hematuria    TECHNIQUE:  Axial computed tomography images of the abdomen and pelvis without and  with intravenous contrast.  Sagittal and coronal reformatted images were  created and reviewed.  This CT exam was performed using one or more of  the following dose reduction techniques:  automated exposure control,  adjustment of the mA and/or kV according to patient size, and/or use of  iterative reconstruction technique.    COMPARISON:  No relevant prior studies available.    FINDINGS:  LUNG BASES:   Unremarkable.  No mass.  No consolidation.    ABDOMEN:  LIVER:  Unremarkable.  No mass.  GALLBLADDER AND BILE DUCTS:  Unremarkable.  No calcified stones.  No  ductal dilation.  PANCREAS:  Unremarkable.  No mass.  No ductal dilation.  SPLEEN:  Unremarkable.  No splenomegaly.  ADRENALS:  Unremarkable.  No mass.  KIDNEYS AND URETERS:  Right renal cyst measuring 1.7 cm.  No  obstructing stones.  No hydronephrosis.  STOMACH AND BOWEL:  Unremarkable.  No obstruction.  No mucosal  thickening.    PELVIS:  APPENDIX:  No findings to suggest acute appendicitis.  BLADDER:  Urinary bladder wall thickening that could be due to outlet  obstruction.  Left urinary bladder posterior wall diverticulum measuring  up to 3 cm.  No stones.  REPRODUCTIVE:  Mild prostate enlargement with prostate calcifications  noted.    ABDOMEN and PELVIS:  INTRAPERITONEAL SPACE:  Unremarkable.  No free air.  No significant  fluid collection.  BONES/JOINTS:  No acute fracture.  No dislocation.  SOFT TISSUES:  Unremarkable.  VASCULATURE:  Atherosclerotic vascular calcification.  No abdominal  aortic aneurysm.  LYMPH NODES:  Unremarkable.  No enlarged lymph nodes.    Impression  1.  Urinary bladder wall thickening that could be due to outlet  obstruction.  2.  Left urinary bladder posterior wall diverticulum measuring up to 3  cm.  3.  Mild prostate enlargement with prostate calcifications noted.    This report was finalized on 8/18/2021 9:17 AM by Dr. Nigel Sun MD.     CT Stone Protocol: No results found for this or any previous visit.     KUB: No results found for this or any previous visit.       Labs:  Brief Urine Lab Results  (Last result in the past 365 days)        Color   Clarity   Blood   Leuk Est   Nitrite   Protein   CREAT   Urine HCG        09/19/23 1048 Yellow   Clear   3+   Trace   Negative   Trace                 Office Visit on 09/19/2023   Component Date Value Ref Range Status    Color 09/19/2023 Yellow  Yellow, Straw, Dark Yellow,  Sarah Final    Clarity, UA 09/19/2023 Clear  Clear Final    Specific Gravity  09/19/2023 1.015  1.005 - 1.030 Final    pH, Urine 09/19/2023 5.5  5.0 - 8.0 Final    Leukocytes 09/19/2023 Trace (A)  Negative Final    Nitrite, UA 09/19/2023 Negative  Negative Final    Protein, POC 09/19/2023 Trace (A)  Negative mg/dL Final    Glucose, UA 09/19/2023 Negative  Negative mg/dL Final    Ketones, UA 09/19/2023 Negative  Negative Final    Urobilinogen, UA 09/19/2023 Normal  Normal, 0.2 E.U./dL Final    Bilirubin 09/19/2023 Negative  Negative Final    Blood, UA 09/19/2023 3+ (A)  Negative Final    Lot Number 09/19/2023 [id number]   Final    Expiration Date 09/19/2023 10/25/24   Final   Office Visit on 08/10/2023   Component Date Value Ref Range Status    Color 08/10/2023 Red (A)  Yellow, Straw, Dark Yellow, Sarah Final    Clarity, UA 08/10/2023 Clear  Clear Final    Specific Gravity  08/10/2023 1.030  1.005 - 1.030 Final    pH, Urine 08/10/2023 6.0  5.0 - 8.0 Final    Leukocytes 08/10/2023 Large (3+) (A)  Negative Final    Nitrite, UA 08/10/2023 Negative  Negative Final    Protein, POC 08/10/2023 Negative  Negative mg/dL Final    Glucose, UA 08/10/2023 Negative  Negative mg/dL Final    Ketones, UA 08/10/2023 Negative  Negative Final    Urobilinogen, UA 08/10/2023 Normal  Normal, 0.2 E.U./dL Final    Bilirubin 08/10/2023 Negative  Negative Final    Blood, UA 08/10/2023 3+ (A)  Negative Final    Lot Number 08/10/2023 [id number]   Final    Expiration Date 08/10/2023 102,024   Final    Urine Culture 08/10/2023 >100,000 CFU/mL Staphylococcus epidermidis (A)   Final        Procedure: None  Procedures     I have reviewed and agree with the above PMH, PSH, FMH, social history, medications, allergies, and labs.     Assessment/Plan:   Problem List Items Addressed This Visit          Genitourinary and Reproductive     Acute cystitis    Relevant Orders    POC Urinalysis Dipstick, Automated (Completed)    Urine Culture - Urine,  Urine, Random Void     Other Visit Diagnoses       Urinary tract infection with hematuria, site unspecified    -  Primary    Relevant Orders    POC Urinalysis Dipstick, Automated (Completed)    Urine Culture - Urine, Urine, Random Void            Health Maintenance:   Health Maintenance Due   Topic Date Due    BMI FOLLOWUP  Never done    ZOSTER VACCINE (1 of 2) Never done    LUNG CANCER SCREENING  Never done    TDAP/TD VACCINES (2 - Td or Tdap) 11/07/2016    HEPATITIS C SCREENING  Never done    ANNUAL WELLNESS VISIT  Never done    Pneumococcal Vaccine 65+ (1 - PCV) Never done    COVID-19 Vaccine (4 - Pfizer series) 10/29/2021    LIPID PANEL  11/05/2021        Smoking Counseling: Former smoker.  Never used smokeless tobacco.    Urine Incontinence: Patient reports that he is not currently experiencing any symptoms of urinary incontinence.    Patient was given instructions and counseling regarding his condition or for health maintenance advice. Please see specific information pulled into the AVS if appropriate.    Patient Education:   UTI/acute cystitis -patient's urine today shows trace leukocytes and 3+ blood.  I will send this back off for culture.  Patient is doing much better and has no lingering urinary tract symptoms.  We will hold off antibiotics.  I will call him with culture results once obtained otherwise we will see him back in roughly 6 months.  Vies him return to the clinic sooner if needed.    Visit Diagnoses:    ICD-10-CM ICD-9-CM   1. Urinary tract infection with hematuria, site unspecified  N39.0 599.0    R31.9 599.70   2. Acute cystitis without hematuria  N30.00 595.0       Meds Ordered During Visit:  No orders of the defined types were placed in this encounter.      Follow Up Appointment:   No follow-ups on file.      This document has been electronically signed by Ciro Marquis PA-C   September 19, 2023 11:38 EDT    Part of this note may be an electronic transcription/translation of spoken  language to printed text using the Dragon Dictation System.

## 2023-09-20 LAB — BACTERIA SPEC AEROBE CULT: NO GROWTH

## 2023-09-21 ENCOUNTER — TELEPHONE (OUTPATIENT)
Dept: UROLOGY | Facility: CLINIC | Age: 73
End: 2023-09-21
Payer: MEDICARE

## 2023-09-21 NOTE — TELEPHONE ENCOUNTER
Called and advised patient that urine culture showed no growth.  He is on answer so I left a voicemail.

## 2024-03-20 ENCOUNTER — OFFICE VISIT (OUTPATIENT)
Dept: UROLOGY | Facility: CLINIC | Age: 74
End: 2024-03-20
Payer: MEDICARE

## 2024-03-20 VITALS
DIASTOLIC BLOOD PRESSURE: 71 MMHG | HEIGHT: 72 IN | SYSTOLIC BLOOD PRESSURE: 131 MMHG | BODY MASS INDEX: 26.33 KG/M2 | HEART RATE: 61 BPM | WEIGHT: 194.4 LBS

## 2024-03-20 DIAGNOSIS — N40.0 BENIGN PROSTATIC HYPERPLASIA WITHOUT LOWER URINARY TRACT SYMPTOMS: ICD-10-CM

## 2024-03-20 DIAGNOSIS — R31.9 URINARY TRACT INFECTION WITH HEMATURIA, SITE UNSPECIFIED: Primary | ICD-10-CM

## 2024-03-20 DIAGNOSIS — N39.0 URINARY TRACT INFECTION WITH HEMATURIA, SITE UNSPECIFIED: Primary | ICD-10-CM

## 2024-03-20 LAB
BILIRUB BLD-MCNC: NEGATIVE MG/DL
CLARITY, POC: CLEAR
COLOR UR: YELLOW
EXPIRATION DATE: ABNORMAL
GLUCOSE UR STRIP-MCNC: NEGATIVE MG/DL
KETONES UR QL: NEGATIVE
LEUKOCYTE EST, POC: ABNORMAL
Lab: ABNORMAL
NITRITE UR-MCNC: NEGATIVE MG/ML
PH UR: 6.5 [PH] (ref 5–8)
PROT UR STRIP-MCNC: NEGATIVE MG/DL
RBC # UR STRIP: ABNORMAL /UL
SP GR UR: 1.01 (ref 1–1.03)
UROBILINOGEN UR QL: NORMAL

## 2024-03-20 PROCEDURE — 87086 URINE CULTURE/COLONY COUNT: CPT

## 2024-03-20 PROCEDURE — 84153 ASSAY OF PSA TOTAL: CPT

## 2024-03-20 RX ORDER — SIMVASTATIN 20 MG
20 TABLET ORAL DAILY
COMMUNITY
Start: 2024-01-29

## 2024-03-20 NOTE — PROGRESS NOTES
"Chief Complaint:    Chief Complaint   Patient presents with    Urinary tract infection with hematuria, site unspecified       Vital Signs:   /71 (BP Location: Left arm, Patient Position: Sitting, Cuff Size: Adult)   Pulse 61   Ht 182.9 cm (72.01\")   Wt 88.2 kg (194 lb 6.4 oz)   BMI 26.36 kg/m²   Body mass index is 26.36 kg/m².      HPI:  Jc Graves is a 73 y.o. male who presents today for follow up    History of Present Illness  Mr. Martinez presents to the clinic for follow-up for urinary tract infection and gross hematuria.  Patient was seen roughly 9 months ago and diagnosed with acute urinary tract infection that was treated appropriately with antibiotics.  He does have a past medical history significant for postprocedural bulbous urethral stricture as well as diverticuli the bladder.  He underwent a dilation by Dr. Julian in 2021.  He did follow-up roughly 6 months ago and urine culture at that time showed no growth.  He presents today for reevaluation.  He does continue to take Eliquis twice daily.  He also reports he was recently diagnosed with anemia and has been taking iron pills.  He denies any significant notable gross hematuria.  He denies any lower urinary tract symptoms including but not limited to frequency, urgency, difficulty urinating, dysuria, gross hematuria, penile pain, or hesitancy/inability to urinate.  Patient's UA in office today shows 3+ blood and 1+ leukocytes.  Otherwise he has no complaints.  Given patient is unsure when he had last PSA checked I will check on office today and call with results once available.      Past Medical History:  Past Medical History:   Diagnosis Date    Atrial fibrillation     Dry skin dermatitis     Elevated cholesterol     Hypertension     Stroke 2009    TIA       Current Meds:  Current Outpatient Medications   Medication Sig Dispense Refill    allopurinol (ZYLOPRIM) 100 MG tablet 1 tablet.      apixaban (ELIQUIS) 5 MG tablet tablet Take 1 tablet " by mouth 2 (Two) Times a Day. 60 tablet 0    colchicine 0.6 MG tablet TAKE ONE TABLET BY MOUTH EVERY DAY as needed for gout prevention      FLUoxetine (PROzac) 20 MG capsule Take 1 capsule by mouth Daily.      HYDROcodone-acetaminophen (NORCO)  MG per tablet Take 1 tablet by mouth Every 4 (Four) Hours As Needed for Moderate Pain  (Pain). 12 tablet 0    losartan-hydrochlorothiazide (HYZAAR) 100-25 MG per tablet Take 1 tablet by mouth Daily.      simvastatin (ZOCOR) 20 MG tablet Take 1 tablet by mouth Daily.      triamcinolone (KENALOG) 0.1 % cream Apply 1 application topically to the appropriate area as directed 2 (Two) Times a Day As Needed for Irritation. 15 g 4     No current facility-administered medications for this visit.        Allergies:   No Known Allergies     Past Surgical History:  Past Surgical History:   Procedure Laterality Date    COLONOSCOPY N/A 2018    Procedure: COLONOSCOPY FOR SCREENING;  Surgeon: Neeraj Monahan III, MD;  Location: [facility];  Service:     SHOULDER ROTATOR CUFF REPAIR Left     URETHRAL DILATATION N/A 2021    Procedure: URETHRAL DILATATION, cystoscopy, strictures, martinez cath placement;  Surgeon: Carmine Julian MD;  Location: [facility];  Service: Urology;  Laterality: N/A;       Social History:  Social History     Socioeconomic History    Marital status:    Tobacco Use    Smoking status: Former     Current packs/day: 0.00     Average packs/day: 1 pack/day for 40.0 years (40.0 ttl pk-yrs)     Types: Cigarettes     Start date:      Quit date: 2010     Years since quittin.2    Smokeless tobacco: Never   Vaping Use    Vaping status: Never Used   Substance and Sexual Activity    Alcohol use: No    Drug use: No    Sexual activity: Defer       Family History:  Family History   Problem Relation Age of Onset    No Known Problems Father     No Known Problems Mother        Review of Systems:  Review of Systems   Constitutional:  Negative for chills,  fatigue, fever and unexpected weight change.   Respiratory:  Negative for cough, chest tightness, shortness of breath and wheezing.    Cardiovascular:  Negative for chest pain.   Gastrointestinal:  Negative for abdominal pain, constipation, diarrhea, nausea and vomiting.   Genitourinary:  Negative for difficulty urinating, dysuria, frequency, hematuria, penile swelling, scrotal swelling, testicular pain and urgency.   Musculoskeletal:  Negative for back pain and joint swelling.   Skin:  Negative for rash.   Neurological:  Negative for dizziness and headaches.   Psychiatric/Behavioral:  Negative for confusion and suicidal ideas.        Physical Exam:  Physical Exam  Constitutional:       General: He is not in acute distress.     Appearance: Normal appearance.   HENT:      Head: Normocephalic and atraumatic.      Nose: Nose normal.      Mouth/Throat:      Mouth: Mucous membranes are moist.   Eyes:      Conjunctiva/sclera: Conjunctivae normal.   Cardiovascular:      Rate and Rhythm: Normal rate and regular rhythm.      Pulses: Normal pulses.      Heart sounds: Normal heart sounds.   Pulmonary:      Effort: Pulmonary effort is normal.      Breath sounds: Normal breath sounds.   Abdominal:      General: Bowel sounds are normal.      Palpations: Abdomen is soft.   Musculoskeletal:         General: Normal range of motion.      Cervical back: Normal range of motion.   Skin:     General: Skin is warm.   Neurological:      General: No focal deficit present.      Mental Status: He is alert and oriented to person, place, and time.   Psychiatric:         Mood and Affect: Mood normal.         Behavior: Behavior normal.         Thought Content: Thought content normal.         Judgment: Judgment normal.             Recent Image (CT and/or KUB):   CT Abdomen and Pelvis: Results for orders placed during the hospital encounter of 08/18/21    CT Abdomen Pelvis With & Without Contrast    Narrative  EXAM:  CT Abdomen and Pelvis Without  and With Intravenous Contrast    EXAM DATE:  8/18/2021 8:30 AM    CLINICAL HISTORY:  Hematuria, unknown cause; R31.0-Gross hematuria; N30.01-Acute cystitis  with hematuria    TECHNIQUE:  Axial computed tomography images of the abdomen and pelvis without and  with intravenous contrast.  Sagittal and coronal reformatted images were  created and reviewed.  This CT exam was performed using one or more of  the following dose reduction techniques:  automated exposure control,  adjustment of the mA and/or kV according to patient size, and/or use of  iterative reconstruction technique.    COMPARISON:  No relevant prior studies available.    FINDINGS:  LUNG BASES:  Unremarkable.  No mass.  No consolidation.    ABDOMEN:  LIVER:  Unremarkable.  No mass.  GALLBLADDER AND BILE DUCTS:  Unremarkable.  No calcified stones.  No  ductal dilation.  PANCREAS:  Unremarkable.  No mass.  No ductal dilation.  SPLEEN:  Unremarkable.  No splenomegaly.  ADRENALS:  Unremarkable.  No mass.  KIDNEYS AND URETERS:  Right renal cyst measuring 1.7 cm.  No  obstructing stones.  No hydronephrosis.  STOMACH AND BOWEL:  Unremarkable.  No obstruction.  No mucosal  thickening.    PELVIS:  APPENDIX:  No findings to suggest acute appendicitis.  BLADDER:  Urinary bladder wall thickening that could be due to outlet  obstruction.  Left urinary bladder posterior wall diverticulum measuring  up to 3 cm.  No stones.  REPRODUCTIVE:  Mild prostate enlargement with prostate calcifications  noted.    ABDOMEN and PELVIS:  INTRAPERITONEAL SPACE:  Unremarkable.  No free air.  No significant  fluid collection.  BONES/JOINTS:  No acute fracture.  No dislocation.  SOFT TISSUES:  Unremarkable.  VASCULATURE:  Atherosclerotic vascular calcification.  No abdominal  aortic aneurysm.  LYMPH NODES:  Unremarkable.  No enlarged lymph nodes.    Impression  1.  Urinary bladder wall thickening that could be due to outlet  obstruction.  2.  Left urinary bladder posterior wall  diverticulum measuring up to 3  cm.  3.  Mild prostate enlargement with prostate calcifications noted.    This report was finalized on 8/18/2021 9:17 AM by Dr. Nigel Sun MD.     CT Stone Protocol: No results found for this or any previous visit.     KUB: No results found for this or any previous visit.       Labs:  Brief Urine Lab Results  (Last result in the past 365 days)        Color   Clarity   Blood   Leuk Est   Nitrite   Protein   CREAT   Urine HCG        03/20/24 1334 Yellow   Clear   3+   Small (1+)   Negative   Negative                 Office Visit on 03/20/2024   Component Date Value Ref Range Status    Color 03/20/2024 Yellow  Yellow, Straw, Dark Yellow, Sarah Final    Clarity, UA 03/20/2024 Clear  Clear Final    Specific Gravity  03/20/2024 1.010  1.005 - 1.030 Final    pH, Urine 03/20/2024 6.5  5.0 - 8.0 Final    Leukocytes 03/20/2024 Small (1+) (A)  Negative Final    Nitrite, UA 03/20/2024 Negative  Negative Final    Protein, POC 03/20/2024 Negative  Negative mg/dL Final    Glucose, UA 03/20/2024 Negative  Negative mg/dL Final    Ketones, UA 03/20/2024 Negative  Negative Final    Urobilinogen, UA 03/20/2024 Normal  Normal, 0.2 E.U./dL Final    Bilirubin 03/20/2024 Negative  Negative Final    Blood, UA 03/20/2024 3+ (A)  Negative Final    Lot Number 03/20/2024 [id number]   Final    Expiration Date 03/20/2024 10/25/24   Final    PSA 03/20/2024 1.610  0.000 - 4.000 ng/mL Final        Procedure: None  Procedures     I have reviewed and agree with the above PMH, PSH, FMH, social history, medications, allergies, and labs.     Assessment/Plan:   Problem List Items Addressed This Visit    None  Visit Diagnoses       Urinary tract infection with hematuria, site unspecified    -  Primary    Relevant Orders    POC Urinalysis Dipstick, Automated (Completed)    Urine Culture - Urine, Urine, Random Void    Benign prostatic hyperplasia without lower urinary tract symptoms        Relevant Orders    PSA  Diagnostic (Completed)            Health Maintenance:   Health Maintenance Due   Topic Date Due    ZOSTER VACCINE (1 of 2) Never done    LUNG CANCER SCREENING  Never done    RSV Vaccine - Adults (1 - 1-dose 60+ series) Never done    TDAP/TD VACCINES (2 - Td or Tdap) 11/07/2016    HEPATITIS C SCREENING  Never done    ANNUAL WELLNESS VISIT  Never done    Pneumococcal Vaccine 65+ (1 of 1 - PCV) Never done    LIPID PANEL  11/01/2020    BMI FOLLOWUP  05/03/2022    INFLUENZA VACCINE  Never done    COVID-19 Vaccine (4 - 2023-24 season) 09/01/2023        Smoking Counseling: Former smoker.  Never used smokeless tobacco.  Counseling given.    Urine Incontinence: Patient reports that he is not currently experiencing any symptoms of urinary incontinence.    Patient was given instructions and counseling regarding his condition or for health maintenance advice. Please see specific information pulled into the AVS if appropriate.    Patient Education:   Urinary tract infection with hematuria -patient has not had any significant gross hematuria since last infection.  Given 1+ leukocytes and 3+ blood on UA today I will send patient's urine off to rule out repeat urinary tract infection.  Given no significant lower urinary tract symptoms we will hold off on antibiotic at this time.  I did advise patient given persistent microscopic hematuria and history of bladder diverticula appropriate workup with a CT scan of the abdomen and pelvis with and without contrast would help rule out any significant concerns of bladder carcinoma or other issues related to microscopic hematuria.  Did also discussed with the patient the use of a cystoscopy for evaluation of the lower urinary tract system.  Patient would like to hold off on procedures until urine culture is finalized.  I will call him with results once available.  Patient verbalized understanding.  PSA testing - I am recommending a prostate specific antigen blood test or PSA.  I discussed  the pathophysiology of PSA testing indicating its use in the diagnosis and management of prostate cancer.  I discussed the normal range being 0 to 4, but more appropriately being much closer to 0 to 2 in a normal male.  I discussed the fact that after a certain age it is against recommendation to use PSA testing especially in view of numerous comorbidities.  I discussed many of the things that can artificially raise PSA including put not limited to a recent infection, urinary tract infection, recent sexual intercourse, or even the type of movement such as manipulation of the prostate from riding a bicycle.  It was discussed that the most important use of PSA is the velocity measurement.  This refers to the change of PSA with time. I discussed that we look for greater than 20% rise over a year to help us make the prediction of prostate cancer.  I also discussed that in the case of prostate cancer indicating a radical prostatectomy, the PSA should be 0 and any rise indicates an early biochemical recurrence.  I will call patient with results once available.  Otherwise we will see him back in roughly 1 year or sooner if needed    Visit Diagnoses:    ICD-10-CM ICD-9-CM   1. Urinary tract infection with hematuria, site unspecified  N39.0 599.0    R31.9 599.70   2. Benign prostatic hyperplasia without lower urinary tract symptoms  N40.0 600.00       Meds Ordered During Visit:  No orders of the defined types were placed in this encounter.      Follow Up Appointment: 1 year or sooner if needed  No follow-ups on file.      This document has been electronically signed by Ciro Marquis PA-C   March 21, 2024 09:22 EDT    Part of this note may be an electronic transcription/translation of spoken language to printed text using the Dragon Dictation System.

## 2024-03-21 ENCOUNTER — TELEPHONE (OUTPATIENT)
Dept: UROLOGY | Facility: CLINIC | Age: 74
End: 2024-03-21
Payer: MEDICARE

## 2024-03-21 LAB — PSA SERPL-MCNC: 1.61 NG/ML (ref 0–4)

## 2024-03-21 NOTE — TELEPHONE ENCOUNTER
Tried to call patient about PSA results.  Did not answer left voicemail advising PSA came back great.  No concerns at this time.  Educated to call back with questions or concerns.

## 2024-03-22 ENCOUNTER — TELEPHONE (OUTPATIENT)
Dept: UROLOGY | Facility: CLINIC | Age: 74
End: 2024-03-22
Payer: MEDICARE

## 2024-03-22 LAB — BACTERIA SPEC AEROBE CULT: ABNORMAL

## 2024-03-22 NOTE — TELEPHONE ENCOUNTER
Called patient to discuss culture results.  Positive urine culture showed skin contaminants only.  Do not recommend treatment unless he was having symptomatic or undergoing urological procedure.  He denies any symptoms at this time send recommend to continue with observation.  Advised him to call with questions or concerns but otherwise keep follow-up in a year.  He verbalized understanding.   No

## 2024-05-28 RX ORDER — ALLOPURINOL 100 MG/1
200 TABLET ORAL DAILY
Qty: 180 TABLET | Refills: 0 | Status: SHIPPED | OUTPATIENT
Start: 2024-05-28

## 2024-05-28 NOTE — TELEPHONE ENCOUNTER
Incoming Refill Request      Medication requested (name and dose): ALLOPURINOL 100MG    Pharmacy where request should be sent: [facility] .[phone]    Additional details provided by patient: OUT OF MEDS    Best call back number: [phone]    Does the patient have less than a 3 day supply:  [x] Yes  [] No    PHARMACY PHONE [phone]    PT IS ALSO SCHEDULED FOR A FU ON 8/5/24    [employer]  05/28/24, 10:31 EDT

## 2024-08-20 ENCOUNTER — OFFICE VISIT (OUTPATIENT)
Age: 74
End: 2024-08-20
Payer: MEDICARE

## 2024-08-20 VITALS
WEIGHT: 183.5 LBS | HEIGHT: 70 IN | SYSTOLIC BLOOD PRESSURE: 126 MMHG | DIASTOLIC BLOOD PRESSURE: 80 MMHG | BODY MASS INDEX: 26.27 KG/M2 | TEMPERATURE: 97.6 F | HEART RATE: 61 BPM

## 2024-08-20 DIAGNOSIS — M65.341 TRIGGER RING FINGER OF RIGHT HAND: ICD-10-CM

## 2024-08-20 DIAGNOSIS — I48.91 ATRIAL FIBRILLATION, UNSPECIFIED TYPE: ICD-10-CM

## 2024-08-20 DIAGNOSIS — M1A.09X0 CHRONIC GOUT OF MULTIPLE SITES, UNSPECIFIED CAUSE: Primary | ICD-10-CM

## 2024-08-20 PROCEDURE — 99213 OFFICE O/P EST LOW 20 MIN: CPT | Performed by: INTERNAL MEDICINE

## 2024-08-20 PROCEDURE — 1160F RVW MEDS BY RX/DR IN RCRD: CPT | Performed by: INTERNAL MEDICINE

## 2024-08-20 PROCEDURE — G2211 COMPLEX E/M VISIT ADD ON: HCPCS | Performed by: INTERNAL MEDICINE

## 2024-08-20 PROCEDURE — 1159F MED LIST DOCD IN RCRD: CPT | Performed by: INTERNAL MEDICINE

## 2024-08-20 RX ORDER — ALLOPURINOL 100 MG/1
200 TABLET ORAL DAILY
Qty: 180 TABLET | Refills: 3 | Status: SHIPPED | OUTPATIENT
Start: 2024-08-20

## 2024-08-20 RX ORDER — PNV NO.95/FERROUS FUM/FOLIC AC 28MG-0.8MG
325 TABLET ORAL
COMMUNITY

## 2024-08-20 NOTE — ASSESSMENT & PLAN NOTE
Retired .  Drove train from [place] to [place]  Initial gout flare left ankle foot sudden onset severe pain 6/22 for which he went to ED [facility] [place].  Uric acid elevated.  Improved on Medrol/Colchicine  He then had flare left hand early October 2022 responsive to colchicine  He avoids NSAIDs with CKD , chronic anticoagulation for atrial fibrillation  **Current:  Allopurinol 200 mg, p.r.n. colchicine.    History consistent with gouty arthritis sudden onset severe pain swelling in June of 2022 left ankle foot with elevated uric acid.  He had a 2nd flare in the left hand in early October 2022 which responded rapidly to colchicine.      -No recent gout flare.  He continues on allopurinol 200 mg and has stopped colchicine.  He avoids NSAIDs with chronic anticoagulation for atrial fibrillation. He does not have evidence clinically of rheumatoid arthritis, psoriatic arthritis, lupus or connective tissue disease to explain his joint pain.    We discussed that he should avoid red meat, alcohol, sugar, process foods which could all trigger gout flares.  No recent flares in the joints.  Uric acid at goal.    We discussed the gout is a problem with elevated uric acid and that ideally we should try to keep his uric acid below 6 to prevent further flares going forward.  I refilled his allopurinol 200mg qd.  He can just take the colchicine as needed if further flare  Labs reveiwed from 8/1/24 from [facility] with stable findings mld anemia and CKD    He fell off a stage in [place] on 7/14/24 and left wrist and right knee.    Went to  trauma center and now following with [facility] and doing PT  Follow-up 6 months

## 2024-08-20 NOTE — PROGRESS NOTES
Office Follow Up      Date: 08/20/2024   Patient Name: Jc Graves  MRN: [id number]  YOB: 1950    Referring Physician: No ref. provider found     Chief Complaint: No chief complaint on file.      History of Present Illness: Jc Graves is a 73 y.o. male who is here today for follow up on gout    Itial consult 11/1/22:    71-year-old  male with atrial fibrillation on chronic anticoagulation seen today in consultation from his primary care provider for arthritis.  He reports that in June of 2022 he had sudden onset of severe pain and swelling in his left foot and ankle.  He could not even touch the foot with a bed sheet because of the pain.  No injury to the ankle.  Unsure what started this.  He went to [facility] where he had x-ray of the foot and ankle that reportedly was unrevealing without evidence of fracture.  He reports his uric acid was elevated and he was diagnosed with gout.  He was treated with methylprednisolone and colchicine with rapid improvement.  This is the 1st time he ever had gout that he is aware of.    In early October 2022 he had pain and swelling in the left hand that he thought might be gout related as well.  He was given colchicine with significant improvement in the left hand.  Today the only joint bothering him is his right 4th finger which triggers and locks.  It is often stuck in flexed position when he wakes in the morning.  He is not currently having any sort of gout flare.  No history of psoriasis.  No swollen joints presently.  He generally avoids NSAIDs with his chronic anticoagulation.  He continues on colchicine once daily.   He is taking over-the-counter cherry juice and apple cider for arthritis and gout.  He has tried to adjust his diet with cutting out red meat, avoiding shellfish now alcohol since his initial gout diagnosis June of 2022.     Interim 8/20/24:  He fell off a stage in [place] on  7/14/24 and left wrist and right knee.  Went to  trauma center and now following with  ortho.  He has been doing well recently in regards to gout on allopurinol 200 mg daily.  No further flare-ups in the joints.  No hot swollen or tender joints.  No side effects to medicines.  No longer using colchicine.      Subjective       Review of Systems: Review of Systems   Constitutional:  Positive for fatigue. Negative for chills, fever and unexpected weight loss.   HENT:  Negative for mouth sores, sinus pressure and sore throat.    Eyes:  Negative for pain and redness.   Respiratory:  Negative for cough and shortness of breath.    Cardiovascular:  Negative for chest pain.   Gastrointestinal:  Negative for abdominal pain, blood in stool, diarrhea, nausea, vomiting and GERD.   Endocrine: Negative for polydipsia and polyuria.   Genitourinary:  Negative for dysuria, genital sores and hematuria.   Musculoskeletal:  Negative for arthralgias, back pain, joint swelling, myalgias, neck pain and neck stiffness.   Skin:  Negative for rash and bruise.   Allergic/Immunologic: Negative for immunocompromised state.   Neurological:  Positive for weakness. Negative for seizures, numbness and memory problem.   Hematological:  Negative for adenopathy. Bruises/bleeds easily.   Psychiatric/Behavioral:  Negative for depressed mood. The patient is not nervous/anxious.       Medications:   Current Outpatient Medications:     allopurinol (ZYLOPRIM) 100 MG tablet, Take 2 tablets by mouth Daily., Disp: 180 tablet, Rfl: 0    apixaban (ELIQUIS) 5 MG tablet tablet, Take 1 tablet by mouth 2 (Two) Times a Day., Disp: 60 tablet, Rfl: 0    colchicine 0.6 MG tablet, TAKE ONE TABLET BY MOUTH EVERY DAY as needed for gout prevention, Disp: , Rfl:     FLUoxetine (PROzac) 20 MG capsule, Take 1 capsule by mouth Daily., Disp: , Rfl:     HYDROcodone-acetaminophen (NORCO)  MG per tablet, Take 1 tablet by mouth Every 4 (Four) Hours As Needed for Moderate  Pain  (Pain)., Disp: 12 tablet, Rfl: 0    losartan-hydrochlorothiazide (HYZAAR) 100-25 MG per tablet, Take 1 tablet by mouth Daily., Disp: , Rfl:     simvastatin (ZOCOR) 20 MG tablet, Take 1 tablet by mouth Daily., Disp: , Rfl:     triamcinolone (KENALOG) 0.1 % cream, Apply 1 application topically to the appropriate area as directed 2 (Two) Times a Day As Needed for Irritation., Disp: 15 g, Rfl: 4    Allergies: No Known Allergies    I have reviewed and updated the patient's chief complaint, history of present illness, review of systems, past medical history, surgical history, family history, social history, medications and allergy list as appropriate.     Objective        Vital Signs: There were no vitals filed for this visit.  There is no height or weight on file to calculate BMI.      Physical Exam:  Physical Exam   MUSCULOSKELETAL:   No peripheral synovitis    Complete joint exam was performed including the MCPs, PIPs, DIPs of the hands, wrists, elbows, shoulders, hips, knees and ankles.  No soft tissue swelling or tenderness is present except as above.    General: The patient is well-developed and well nourished. Cooperative, alert and oriented. Affect is normal. Hydration appears normal.   HEENT: Normocephalic and atraumatic. No notable alopecia. Lids and conjunctiva are normal. Pupils are equal and sclera are clear. Oropharynx is clear   NECK neck is supple without adenopathy, masses or thyromegaly.   CARDIOVASCULAR: Regular rate and rhythm. No murmurs, rubs or gallops   LUNGS: Effort is normal. Lungs are clear bilateral   ABDOMEN: Not examined  EXTREMITIES: Peripheral pulses are intact. No clubbing.   SKIN: No rashes. No subcutaneous nodules. No digital ulcers. No sclerodactyly.   NEUROLOGIC: Gait is normal. Strength testing is normal.  No focal neurologic deficits    Results Review:   Labs:   Lab Results   Component Value Date    GLUCOSE 99 11/05/2021    BUN 18 11/05/2021    CREATININE 1.22 11/05/2021    BCR  "14.8 11/05/2021    K 4.5 11/05/2021    CO2 26.8 11/05/2021    CALCIUM 9.9 11/05/2021    ALBUMIN 4.22 03/08/2021    BILITOT 0.8 03/08/2021    AST 29 03/08/2021    ALT 19 03/08/2021     Lab Results   Component Value Date    WBC 3.87 11/05/2021    HGB 11.1 (L) 11/05/2021    HCT 34.7 (L) 11/05/2021    MCV 89.9 11/05/2021     11/05/2021     No results found for: \"SEDRATE\"  No results found for: \"CRP\"  No results found for: \"QUANTIFERO\", \"QUANTITB1\", \"QUANTITB2\", \"QUANTIFERN\", \"QUANTIFERM\", \"QUANTITBGLDP\"  No results found for: \"RF\"  No results found for: \"HEPBSAG\", \"HEPAIGM\", \"HEPBIGMCORE\", \"HEPCVIRUSABY\"      Procedures    Assessment / Plan        Assessment & Plan  Chronic gout of multiple sites, unspecified cause  Retired .  Drove train from [place] to [place]  Initial gout flare left ankle foot sudden onset severe pain 6/22 for which he went to ED [facility].  Uric acid elevated.  Improved on Medrol/Colchicine  He then had flare left hand early October 2022 responsive to colchicine  He avoids NSAIDs with CKD , chronic anticoagulation for atrial fibrillation  **Current:  Allopurinol 200 mg, p.r.n. colchicine.    History consistent with gouty arthritis sudden onset severe pain swelling in June of 2022 left ankle foot with elevated uric acid.  He had a 2nd flare in the left hand in early October 2022 which responded rapidly to colchicine.      -No recent gout flare.  He continues on allopurinol 200 mg and has stopped colchicine.  He avoids NSAIDs with chronic anticoagulation for atrial fibrillation. He does not have evidence clinically of rheumatoid arthritis, psoriatic arthritis, lupus or connective tissue disease to explain his joint pain.    We discussed that he should avoid red meat, alcohol, sugar, process foods which could all trigger gout flares.  No recent flares in the joints.  Uric acid at goal.    We discussed the gout is a problem with elevated uric acid and that ideally we " should try to keep his uric acid below 6 to prevent further flares going forward.  I refilled his allopurinol 200mg qd.  He can just take the colchicine as needed if further flare  Labs reveiwed from 8/1/24 from [facility] with stable findings mld anemia and CKD    He fell off a stage in [place] on 7/14/24 and left wrist and right knee.    Went to  trauma center and now following with  ortho and doing PT  Follow-up 6 months  Trigger ring finger of right hand  He avoids NSAIDs on chronic anticoagulation for atrial fibrillation  Not bothering him as much.  Consulted [facility]  Atrial fibrillation, unspecified type  Dr Gallo cardiology  On anticoagulation                  Follow Up:   No follow-ups on file.        Axel Ramírez MD  [facility] [place]

## 2024-08-20 NOTE — ASSESSMENT & PLAN NOTE
He avoids NSAIDs on chronic anticoagulation for atrial fibrillation  Not bothering him as much.  Consulted [facility]

## 2024-09-05 ENCOUNTER — TELEPHONE (OUTPATIENT)
Age: 74
End: 2024-09-05
Payer: MEDICARE

## 2024-11-19 ENCOUNTER — OFFICE VISIT (OUTPATIENT)
Dept: UROLOGY | Facility: CLINIC | Age: 74
End: 2024-11-19
Payer: MEDICARE

## 2024-11-19 VITALS
BODY MASS INDEX: 26.23 KG/M2 | HEIGHT: 70 IN | HEART RATE: 59 BPM | DIASTOLIC BLOOD PRESSURE: 75 MMHG | SYSTOLIC BLOOD PRESSURE: 137 MMHG | WEIGHT: 183.2 LBS

## 2024-11-19 DIAGNOSIS — N48.6 PEYRONIE'S DISEASE: Primary | ICD-10-CM

## 2024-11-19 PROCEDURE — 1160F RVW MEDS BY RX/DR IN RCRD: CPT

## 2024-11-19 PROCEDURE — 1159F MED LIST DOCD IN RCRD: CPT

## 2024-11-19 PROCEDURE — 99213 OFFICE O/P EST LOW 20 MIN: CPT

## 2024-11-19 RX ORDER — TADALAFIL 5 MG/1
5 TABLET ORAL DAILY
Qty: 30 TABLET | Refills: 2 | Status: SHIPPED | OUTPATIENT
Start: 2024-11-19

## 2024-11-19 NOTE — PROGRESS NOTES
"Chief Complaint:    Chief Complaint   Patient presents with    Abnormal Penile Curvature       Vital Signs:   /75 (BP Location: Left arm, Patient Position: Sitting, Cuff Size: Adult)   Pulse 59   Ht 177.8 cm (70\")   Wt 83.1 kg (183 lb 3.2 oz)   BMI 26.29 kg/m²   Body mass index is 26.29 kg/m².      HPI:  Jc Graves is a 73 y.o. male who presents today for follow up    History of Present Illness  Mr. Martinez presents to the clinic for follow-up.  He has been seen previously due to urethral stricture and urinary tract infection.  His last office visit he did have positive culture with Staphylococcus.  He has not been seen since.  He returns today due to concerns of abnormal curvature of the penis.  He reports this started roughly 2 to 3 weeks ago.  He does endorse history of fall in July of this year where he had knee and head trauma and was transferred to the [facility].  He has been undergoing physical therapy for issues with his right wrist and hand.  He states that this is improving.  He does have a notable history of trigger finger.  States that he has always had slight downward curvature to the penis however noted that over the past month he has had an increase in curvature downward to roughly 60 to 70 degrees.  He states he is not able to have sexual activity.  He still endorses ability to obtain erections.  He does endorse some discomfort with this.  He denies any recent sexual trauma.  Pertinent negative symptoms include no abdominal pain, no chest pain, no fatigue, no fever, no nausea, no rash, no dysuria and no vomiting.       Past Medical History:  Past Medical History:   Diagnosis Date    Atrial fibrillation     Dry skin dermatitis     Elevated cholesterol     Gout     Hypertension     Stroke 2009    TIA    Trigger finger     Right 4th trigger finger       Current Meds:  Current Outpatient Medications   Medication Sig Dispense Refill    allopurinol (ZYLOPRIM) 100 MG tablet Take 2 " tablets by mouth Daily. 180 tablet 3    apixaban (ELIQUIS) 5 MG tablet tablet Take 1 tablet by mouth 2 (Two) Times a Day. 60 tablet 0    ferrous sulfate 325 (65 Fe) MG tablet Take 1 tablet by mouth Daily With Breakfast.      FLUoxetine (PROzac) 20 MG capsule Take 1 capsule by mouth Daily.      losartan-hydrochlorothiazide (HYZAAR) 100-25 MG per tablet Take 1 tablet by mouth Daily.      simvastatin (ZOCOR) 20 MG tablet Take 1 tablet by mouth Daily.      tadalafil (CIALIS) 5 MG tablet Take 1 tablet by mouth Daily. 30 tablet 2     No current facility-administered medications for this visit.        Allergies:   No Known Allergies     Past Surgical History:  Past Surgical History:   Procedure Laterality Date    COLONOSCOPY N/A 2018    Procedure: COLONOSCOPY FOR SCREENING;  Surgeon: Neeraj Monahan III, MD;  [place] [facility] [place];  Service:     SHOULDER ROTATOR CUFF REPAIR Left     URETHRAL DILATATION N/A 2021    Procedure: URETHRAL DILATATION, cystoscopy, strictures, martinez cath placement;  Surgeon: Carmine Julian MD;  [place] [facility] [place];  Service: Urology;  Laterality: N/A;       Social History:  Social History     Socioeconomic History    Marital status:    Tobacco Use    Smoking status: Former     Current packs/day: 0.00     Average packs/day: 1 pack/day for 40.0 years (40.0 ttl pk-yrs)     Types: Cigarettes     Start date:      Quit date:      Years since quittin.8    Smokeless tobacco: Never   Vaping Use    Vaping status: Never Used   Substance and Sexual Activity    Alcohol use: No    Drug use: No    Sexual activity: Defer       Family History:  Family History   Problem Relation Age of Onset    No Known Problems Father     No Known Problems Mother        Review of Systems:  Review of Systems   Constitutional:  Negative for fatigue, fever and unexpected weight change.   Respiratory:  Negative for chest tightness and shortness of breath.    Cardiovascular:  Negative for chest  pain.   Gastrointestinal:  Negative for abdominal pain, constipation, diarrhea, nausea and vomiting.   Genitourinary:  Negative for difficulty urinating, dysuria, frequency and urgency.        Abnormal curvature of the penis   Skin:  Negative for rash.   Psychiatric/Behavioral:  Negative for confusion and suicidal ideas.        Physical Exam:  Physical Exam  Constitutional:       General: He is not in acute distress.     Appearance: Normal appearance.   HENT:      Head: Normocephalic and atraumatic.      Nose: Nose normal.      Mouth/Throat:      Mouth: Mucous membranes are moist.   Eyes:      Conjunctiva/sclera: Conjunctivae normal.   Cardiovascular:      Rate and Rhythm: Normal rate and regular rhythm.      Pulses: Normal pulses.      Heart sounds: Normal heart sounds.   Pulmonary:      Effort: Pulmonary effort is normal.      Breath sounds: Normal breath sounds.   Abdominal:      General: Bowel sounds are normal.      Palpations: Abdomen is soft.   Genitourinary:     Comments: Normal uncircumcised phallus.  Normal testicles bilaterally.  Slight hard small plaque noted at the ventral aspect of the penis midshaft.  Musculoskeletal:         General: Normal range of motion.      Cervical back: Normal range of motion.   Skin:     General: Skin is warm.   Neurological:      General: No focal deficit present.      Mental Status: He is alert and oriented to person, place, and time.   Psychiatric:         Mood and Affect: Mood normal.         Behavior: Behavior normal.         Thought Content: Thought content normal.         Judgment: Judgment normal.         Recent Image (CT and/or KUB):   CT Abdomen and Pelvis: Results for orders placed during the hospital encounter of 08/18/21    CT Abdomen Pelvis With & Without Contrast    Narrative  EXAM:  CT Abdomen and Pelvis Without and With Intravenous Contrast    EXAM DATE:  8/18/2021 8:30 AM    CLINICAL HISTORY:  Hematuria, unknown cause; R31.0-Gross hematuria; N30.01-Acute  cystitis  with hematuria    TECHNIQUE:  Axial computed tomography images of the abdomen and pelvis without and  with intravenous contrast.  Sagittal and coronal reformatted images were  created and reviewed.  This CT exam was performed using one or more of  the following dose reduction techniques:  automated exposure control,  adjustment of the mA and/or kV according to patient size, and/or use of  iterative reconstruction technique.    COMPARISON:  No relevant prior studies available.    FINDINGS:  LUNG BASES:  Unremarkable.  No mass.  No consolidation.    ABDOMEN:  LIVER:  Unremarkable.  No mass.  GALLBLADDER AND BILE DUCTS:  Unremarkable.  No calcified stones.  No  ductal dilation.  PANCREAS:  Unremarkable.  No mass.  No ductal dilation.  SPLEEN:  Unremarkable.  No splenomegaly.  ADRENALS:  Unremarkable.  No mass.  KIDNEYS AND URETERS:  Right renal cyst measuring 1.7 cm.  No  obstructing stones.  No hydronephrosis.  STOMACH AND BOWEL:  Unremarkable.  No obstruction.  No mucosal  thickening.    PELVIS:  APPENDIX:  No findings to suggest acute appendicitis.  BLADDER:  Urinary bladder wall thickening that could be due to outlet  obstruction.  Left urinary bladder posterior wall diverticulum measuring  up to 3 cm.  No stones.  REPRODUCTIVE:  Mild prostate enlargement with prostate calcifications  noted.    ABDOMEN and PELVIS:  INTRAPERITONEAL SPACE:  Unremarkable.  No free air.  No significant  fluid collection.  BONES/JOINTS:  No acute fracture.  No dislocation.  SOFT TISSUES:  Unremarkable.  VASCULATURE:  Atherosclerotic vascular calcification.  No abdominal  aortic aneurysm.  LYMPH NODES:  Unremarkable.  No enlarged lymph nodes.    Impression  1.  Urinary bladder wall thickening that could be due to outlet  obstruction.  2.  Left urinary bladder posterior wall diverticulum measuring up to 3  cm.  3.  Mild prostate enlargement with prostate calcifications noted.    This report was finalized on 8/18/2021 9:17 AM by  Dr. Nigel Sun MD.     CT Stone Protocol: No results found for this or any previous visit.     KUB: No results found for this or any previous visit.       Labs:  Brief Urine Lab Results  (Last result in the past 365 days)        Color   Clarity   Blood   Leuk Est   Nitrite   Protein   CREAT   Urine HCG        03/20/24 1334 Yellow   Clear   3+   Small (1+)   Negative   Negative                 No visits with results within 3 Month(s) from this visit.   Latest known visit with results is:   Office Visit on 03/20/2024   Component Date Value Ref Range Status    Color 03/20/2024 Yellow  Yellow, Straw, Dark Yellow, Sarah Final    Clarity, UA 03/20/2024 Clear  Clear Final    Specific Gravity  03/20/2024 1.010  1.005 - 1.030 Final    pH, Urine 03/20/2024 6.5  5.0 - 8.0 Final    Leukocytes 03/20/2024 Small (1+) (A)  Negative Final    Nitrite, UA 03/20/2024 Negative  Negative Final    Protein, POC 03/20/2024 Negative  Negative mg/dL Final    Glucose, UA 03/20/2024 Negative  Negative mg/dL Final    Ketones, UA 03/20/2024 Negative  Negative Final    Urobilinogen, UA 03/20/2024 Normal  Normal, 0.2 E.U./dL Final    Bilirubin 03/20/2024 Negative  Negative Final    Blood, UA 03/20/2024 3+ (A)  Negative Final    Lot Number 03/20/2024 [id number]   Final    Expiration Date 03/20/2024 10/25/24   Final    Urine Culture 03/20/2024 >100,000 CFU/mL Staphylococcus, coagulase negative (A)   Final    Call if further workup needed.      PSA 03/20/2024 1.610  0.000 - 4.000 ng/mL Final        Procedure: None  Procedures     I have reviewed and agree with the above PMH, PSH, FMH, social history, medications, allergies, and labs.     Assessment/Plan:   Problem List Items Addressed This Visit    None  Visit Diagnoses       Peyronie's disease    -  Primary    Relevant Medications    tadalafil (CIALIS) 5 MG tablet            Health Maintenance:   Health Maintenance Due   Topic Date Due    ZOSTER VACCINE (1 of 2) Never done    LUNG CANCER  SCREENING  Never done    HEPATITIS C SCREENING  Never done    ANNUAL WELLNESS VISIT  Never done    Pneumococcal Vaccine 65+ (1 of 1 - PCV) Never done    LIPID PANEL  11/01/2020    BMI FOLLOWUP  05/03/2022    INFLUENZA VACCINE  08/01/2024    COVID-19 Vaccine (4 - 2024-25 season) 09/01/2024        Smoking Counseling: Former smoker.  Never used smokeless tobacco.  Counseling given.    Urine Incontinence: Patient reports that he is not currently experiencing any symptoms of urinary incontinence.    Patient was given instructions and counseling regarding his condition or for health maintenance advice. Please see specific information pulled into the AVS if appropriate.    Patient Education:   Penile curvature/Peyronie's -patient presented today for evaluation of Peyronie's disease.  Discussed with the patient the pathophysiology of this condition in detail and causes which can include trauma, Dupuytren contracture, genetics, and age.  Discussed with the patient forms of treatment which can include oral medications, corrector/stretcher devices, surgical intervention, and Xiaflex injections. Advised patient of side effects of injections which can include but not limited to hematoma, bruising, swelling, pain at site of injection and slight pain with erections.  I did advise patient of severe symptoms and signs to look for including but not limited to blood in urine or difficulty urinating, audible pop or feeling of a pop, spontaneous inability to maintain an erection, severe penile pain, or fever/chills.  Advised him if the symptoms occur to return to the clinic promptly or call the Xiaflex hotline for important information.  Did advise the patient the severe risk of corpus cavernosum rupture leading to erectile dysfunction immediate correction.  Patient does not wish to try injections at this time.  Will start him on tadalafil 5 mg once daily and have him obtain a Peyronie's corrector in stretcher device.  Did advise him to  return to the clinic if he has any worsening symptoms.  Educated discontinue tadalafil if he has a prolonged/painful erection or lightheadedness, dizziness, blurry vision, chest pain, or shortness of breath.  Educated discontinue if he begins to experience syncope.  Otherwise we will place him back in a month or sooner if needed.    Visit Diagnoses:    ICD-10-CM ICD-9-CM   1. Peyronie's disease  N48.6 607.85     A total of 25 minutes were spent coordinating this patient’s care in clinic today; 15 minutes of which were face-to-face with the patient, reviewing medical history and counseling on the current treatment and followup plan.  All questions were answered to patient's satisfaction.    Meds Ordered During Visit:  New Medications Ordered This Visit   Medications    tadalafil (CIALIS) 5 MG tablet     Sig: Take 1 tablet by mouth Daily.     Dispense:  30 tablet     Refill:  2       Follow Up Appointment: 1 month  No follow-ups on file.      This document has been electronically signed by Ciro Marquis PA-C   November 19, 2024 12:39 EST    Part of this note may be an electronic transcription/translation of spoken language to printed text using the Dragon Dictation System.

## 2025-02-24 ENCOUNTER — OFFICE VISIT (OUTPATIENT)
Age: 75
End: 2025-02-24
Payer: MEDICARE

## 2025-02-24 VITALS
WEIGHT: 187.4 LBS | HEART RATE: 74 BPM | SYSTOLIC BLOOD PRESSURE: 144 MMHG | HEIGHT: 70 IN | BODY MASS INDEX: 26.83 KG/M2 | TEMPERATURE: 97.4 F | DIASTOLIC BLOOD PRESSURE: 82 MMHG

## 2025-02-24 DIAGNOSIS — M65.341 TRIGGER RING FINGER OF RIGHT HAND: ICD-10-CM

## 2025-02-24 DIAGNOSIS — M1A.09X0 CHRONIC GOUT OF MULTIPLE SITES, UNSPECIFIED CAUSE: Primary | ICD-10-CM

## 2025-02-24 RX ORDER — ALLOPURINOL 100 MG/1
200 TABLET ORAL DAILY
Qty: 180 TABLET | Refills: 3 | Status: SHIPPED | OUTPATIENT
Start: 2025-02-24

## 2025-02-24 NOTE — PROGRESS NOTES
Office Follow Up      Date: 02/24/2025   Patient Name: Jc Graves  MRN: [id number]  YOB: 1950    Referring Physician: No ref. provider found     Chief Complaint:   Chief Complaint   Patient presents with    Gout       History of Present Illness: Jc Graves is a 74 y.o. male with history of CKD, atrial fibrillation who is here today for follow up on gout     Initial consult 11/1/22:    71-year-old  male with atrial fibrillation on chronic anticoagulation seen today in consultation from his primary care provider for arthritis.  He reports that in June of 2022 he had sudden onset of severe pain and swelling in his left foot and ankle.  He could not even touch the foot with a bed sheet because of the pain.  No injury to the ankle.  Unsure what started this.  He went to [facility] where he had x-ray of the foot and ankle that reportedly was unrevealing without evidence of fracture.  He reports his uric acid was elevated and he was diagnosed with gout.  He was treated with methylprednisolone and colchicine with rapid improvement.  This is the 1st time he ever had gout that he is aware of.     In early October 2022 he had pain and swelling in the left hand that he thought might be gout related as well.  He was given colchicine with significant improvement in the left hand.  Today the only joint bothering him is his right 4th finger which triggers and locks.  It is often stuck in flexed position when he wakes in the morning.  He is not currently having any sort of gout flare.  No history of psoriasis.  No swollen joints presently.  He generally avoids NSAIDs with his chronic anticoagulation.  He continues on colchicine once daily.   He is taking over-the-counter cherry juice and apple cider for arthritis and gout.  He has tried to adjust his diet with cutting out red meat, avoiding shellfish now alcohol since his initial gout diagnosis  June of 2022.      Interim 8/20/24:  He fell off a stage in [place] on 7/14/24 and left wrist and right knee.  Went to  trauma center and now following with  ortho.  He has been doing well recently in regards to gout on allopurinol 200 mg daily.  No further flare-ups in the joints.  No hot swollen or tender joints.  No side effects to medicines.  No longer using colchicine.    Interim 2/24/2025: He continues to do well.  No gout flares in the interim.  No side effects to allopurinol.  Tremor is minor      Subjective       Review of Systems: Review of Systems   Constitutional:  Negative for chills, fatigue, fever and unexpected weight loss.   HENT:  Negative for mouth sores, sinus pressure and sore throat.    Eyes:  Negative for pain and redness.   Respiratory:  Negative for cough and shortness of breath.    Cardiovascular:  Negative for chest pain.   Gastrointestinal:  Negative for abdominal pain, blood in stool, diarrhea, nausea, vomiting and GERD.   Endocrine: Negative for polydipsia and polyuria.   Genitourinary:  Negative for dysuria, genital sores and hematuria.   Musculoskeletal:  Negative for arthralgias, back pain, joint swelling, myalgias, neck pain and neck stiffness.   Skin:  Positive for dry skin and bruise. Negative for rash.   Neurological:  Positive for tremors. Negative for seizures, weakness, numbness and memory problem.   Hematological:  Negative for adenopathy. Does not bruise/bleed easily.   Psychiatric/Behavioral:  Negative for depressed mood. The patient is not nervous/anxious.         Past Medical History:   Past Medical History:   Diagnosis Date    Atrial fibrillation     Dry skin dermatitis     Elevated cholesterol     Gout     Hypertension     Stroke 2009    TIA    Trigger finger     Right 4th trigger finger       Past Surgical History:   Past Surgical History:   Procedure Laterality Date    COLONOSCOPY N/A 1/5/2018    Procedure: COLONOSCOPY FOR SCREENING;  Surgeon: Neeraj Monahan  "III, MD;  Location: [facility];  Service:     SHOULDER ROTATOR CUFF REPAIR Left     URETHRAL DILATATION N/A 11/8/2021    Procedure: URETHRAL DILATATION, cystoscopy, strictures, martinez cath placement;  Surgeon: Carmine Julian MD;  Location: [facility];  Service: Urology;  Laterality: N/A;       Family History:   Family History   Problem Relation Age of Onset    No Known Problems Father     No Known Problems Mother        Social History:   Social History     Socioeconomic History    Marital status:    Tobacco Use    Smoking status: Former     Current packs/day: 0.00     Average packs/day: 1 pack/day for 40.0 years (40.0 ttl pk-yrs)     Types: Cigarettes     Start date: 1970     Quit date: 2010     Years since quitting: 15.1    Smokeless tobacco: Never   Vaping Use    Vaping status: Never Used   Substance and Sexual Activity    Alcohol use: No    Drug use: No    Sexual activity: Defer       Medications:   Current Outpatient Medications:     allopurinol (ZYLOPRIM) 100 MG tablet, Take 2 tablets by mouth Daily., Disp: 180 tablet, Rfl: 3    apixaban (ELIQUIS) 5 MG tablet tablet, Take 1 tablet by mouth 2 (Two) Times a Day., Disp: 60 tablet, Rfl: 0    ferrous sulfate 325 (65 Fe) MG tablet, Take 1 tablet by mouth Daily With Breakfast., Disp: , Rfl:     FLUoxetine (PROzac) 20 MG capsule, Take 1 capsule by mouth Daily., Disp: , Rfl:     losartan-hydrochlorothiazide (HYZAAR) 100-25 MG per tablet, Take 1 tablet by mouth Daily., Disp: , Rfl:     simvastatin (ZOCOR) 20 MG tablet, Take 1 tablet by mouth Daily., Disp: , Rfl:     Allergies: No Known Allergies        Objective        Vital Signs:   Vitals:    02/24/25 1316   BP: 144/82   BP Location: Left arm   Patient Position: Sitting   Cuff Size: Adult   Pulse: 74   Temp: 97.4 °F (36.3 °C)   Weight: 85 kg (187 lb 6.4 oz)   Height: 177.8 cm (70\")   PainSc: 0-No pain     Body mass index is 26.89 kg/m².      Physical Exam:  Physical Exam   MUSCULOSKELETAL:   No peripheral " "synovitis    Complete joint exam was performed including the MCPs, PIPs, DIPs of the hands, wrists, elbows, shoulders, hips, knees and ankles.  No soft tissue swelling or tenderness is present except as above.    General: The patient is well-developed and well nourished. Cooperative, alert and oriented. Affect is normal. Hydration appears normal.   HEENT: Normocephalic and atraumatic. Lids and conjunctiva are normal. Pupils are equal and sclera are clear. Oropharynx is clear   NECK neck is supple without adenopathy, masses or thyromegaly.   CARDIOVASCULAR: Regular rate and rhythm. No murmurs, rubs or gallops   LUNGS: Effort is normal. Lungs are clear bilateral   ABDOMEN: Not examined  EXTREMITIES: Peripheral pulses are intact. No clubbing.   SKIN: No rashes. No subcutaneous nodules. No digital ulcers. No sclerodactyly.   NEUROLOGIC: Gait is normal. Strength testing is normal.  No focal neurologic deficits    Results Review:   Labs:   Lab Results   Component Value Date    GLUCOSE 99 11/05/2021    BUN 18 11/05/2021    CREATININE 1.22 11/05/2021    BCR 14.8 11/05/2021    K 4.5 11/05/2021    CO2 26.8 11/05/2021    CALCIUM 9.9 11/05/2021    ALBUMIN 4.22 03/08/2021    BILITOT 0.8 03/08/2021    AST 29 03/08/2021    ALT 19 03/08/2021     Lab Results   Component Value Date    WBC 3.87 11/05/2021    HGB 11.1 (L) 11/05/2021    HCT 34.7 (L) 11/05/2021    MCV 89.9 11/05/2021     11/05/2021     No results found for: \"SEDRATE\"  No results found for: \"CRP\"  No results found for: \"QUANTIFERO\", \"QUANTITB1\", \"QUANTITB2\", \"QUANTIFERN\", \"QUANTIFERM\", \"QUANTITBGLDP\"  No results found for: \"RF\"  No results found for: \"HEPBSAG\", \"HEPAIGM\", \"HEPBIGMCORE\", \"HEPCVIRUSABY\"      Procedures    Assessment / Plan      -Gout   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  Uric acid elevated.  " Improved on Medrol/Colchicine  He then had flare left hand early October 2022 responsive to colchicine  He avoids NSAIDs with CKD , chronic anticoagulation for atrial fibrillation  **Current:  Allopurinol 200 mg, p.r.n. colchicine.     History consistent with gouty arthritis sudden onset severe pain swelling in June of 2022 left ankle foot with elevated uric acid.  He had a 2nd flare in the left hand in early October 2022 which responded rapidly to colchicine.       -No recent gout flare.  He continues on allopurinol 200 mg and has stopped colchicine.    He avoids NSAIDs with chronic anticoagulation for atrial fibrillation.   He does not have evidence clinically of rheumatoid arthritis, psoriatic arthritis, lupus or connective tissue disease to explain his joint pain.     We discussed that he should avoid red meat, alcohol, sugar, process foods which could all trigger gout flares.  No recent flares in the joints.  Uric acid at goal.     We discussed the gout is a problem with elevated uric acid and that ideally we should try to keep his uric acid below 6 to prevent further flares going forward.  I refilled his allopurinol 200mg qd.  He can just take the colchicine as needed if further flare  Labs reveiwed from 8/1/24 from [facility] with stable findings mld anemia and CKD  -Recommend repeat labs CBC CMP sed rate uric acid with his upcoming visit to urology to try and do the labs all at once.  Order provided     He fell off a stage in [place] on 7/14/24 and injured his left wrist and right knee.    Went to  trauma center, [facility] and did PT.  Doing much better  Follow-up 6 months    -Trigger ring finger of right hand  He avoids NSAIDs on chronic anticoagulation for atrial fibrillation  Not bothering him as much.  Consulted [facility]    -Tremor  Improved and mild    -CKD    -Atrial fibrillation  Dr Gallo cardiology  On anticoagulation           Orders Placed This Encounter   Procedures    CBC Auto  Differential    Comprehensive Metabolic Panel    Sedimentation Rate    Uric Acid     New Medications Ordered This Visit   Medications    allopurinol (ZYLOPRIM) 100 MG tablet     Sig: Take 2 tablets by mouth Daily.     Dispense:  180 tablet     Refill:  3       Follow Up:   Return in about 6 months (around 8/24/2025).      Portions of this note have been copied forward.  I have reviewed and updated the patient's chief complaint, history of present illness, review of systems, past medical history, surgical history, family history, social history, medications and allergy list, physical exam, assessment and plan as appropriate.         Axel Ramírez MD  [facility]

## 2025-03-20 ENCOUNTER — OFFICE VISIT (OUTPATIENT)
Dept: UROLOGY | Facility: CLINIC | Age: 75
End: 2025-03-20
Payer: MEDICARE

## 2025-03-20 VITALS
HEART RATE: 53 BPM | HEIGHT: 70 IN | WEIGHT: 184 LBS | BODY MASS INDEX: 26.34 KG/M2 | DIASTOLIC BLOOD PRESSURE: 66 MMHG | SYSTOLIC BLOOD PRESSURE: 130 MMHG

## 2025-03-20 DIAGNOSIS — N40.0 BENIGN PROSTATIC HYPERPLASIA WITHOUT LOWER URINARY TRACT SYMPTOMS: Primary | ICD-10-CM

## 2025-03-20 DIAGNOSIS — M1A.09X0 CHRONIC GOUT OF MULTIPLE SITES, UNSPECIFIED CAUSE: ICD-10-CM

## 2025-03-20 NOTE — PROGRESS NOTES
"Chief Complaint:    Chief Complaint   Patient presents with    Abnormal Penile Curvature       Vital Signs:   /66 (BP Location: Right arm, Patient Position: Sitting, Cuff Size: Adult)   Pulse 53   Ht 177.8 cm (70\")   Wt 83.5 kg (184 lb)   BMI 26.40 kg/m²   Body mass index is 26.4 kg/m².      HPI:  Jc Graves is a 74 y.o. male who presents today for follow up    History of Present Illness  Mr. Graves presents to the clinic for yearly follow-up for Peyronie's disease and prostate enlargement.  He was last seen in office and advised to begin a corrector and stretch her device.  He was also started on tadalafil 5 mg once daily.  He states he took the medication for a few weeks but felt this was not necessary so discontinued it.  States he did attempt to obtain a corrector and stretch her device however they were significantly expensive.  He reports that he and his wife are not currently sexually active and is not concerned about his Peyronie's.  He denies any current lower urinary tract symptoms and states he is urinating well.  He only gets up 1 time throughout the night.  Patient did have a PSA completed in March 2024 that came back good at roughly 1.6.  I am recommending a repeat PSA in office today and a yearly follow-up.  Abnormal Penile Curvature  Pertinent negative symptoms include no abdominal pain, no chest pain, no fatigue, no fever, no nausea, no rash, no dysuria and no vomiting.       Past Medical History:  Past Medical History:   Diagnosis Date    Atrial fibrillation     Dry skin dermatitis     Elevated cholesterol     Gout     Hypertension     Stroke 2009    TIA    Trigger finger     Right 4th trigger finger       Current Meds:  Current Outpatient Medications   Medication Sig Dispense Refill    allopurinol (ZYLOPRIM) 100 MG tablet Take 2 tablets by mouth Daily. 180 tablet 3    apixaban (ELIQUIS) 5 MG tablet tablet Take 1 tablet by mouth 2 (Two) Times a Day. 60 tablet 0    ferrous sulfate 325 " (65 Fe) MG tablet Take 1 tablet by mouth Daily With Breakfast.      FLUoxetine (PROzac) 20 MG capsule Take 1 capsule by mouth Daily.      losartan-hydrochlorothiazide (HYZAAR) 100-25 MG per tablet Take 1 tablet by mouth Daily.      simvastatin (ZOCOR) 20 MG tablet Take 1 tablet by mouth Daily.       No current facility-administered medications for this visit.        Allergies:   No Known Allergies     Past Surgical History:  Past Surgical History:   Procedure Laterality Date    COLONOSCOPY N/A 1/5/2018    Procedure: COLONOSCOPY FOR SCREENING;  Surgeon: Neeraj Monahan III, MD;  Location: [facility];  Service:     SHOULDER ROTATOR CUFF REPAIR Left     URETHRAL DILATATION N/A 11/8/2021    Procedure: URETHRAL DILATATION, cystoscopy, strictures, martinez cath placement;  Surgeon: Carmine Julian MD;  Location: [facility];  Service: Urology;  Laterality: N/A;       Social History:  Social History     Socioeconomic History    Marital status:    Tobacco Use    Smoking status: Former     Current packs/day: 0.00     Average packs/day: 1 pack/day for 40.0 years (40.0 ttl pk-yrs)     Types: Cigarettes     Start date: 1970     Quit date: 2010     Years since quitting: 15.2    Smokeless tobacco: Never   Vaping Use    Vaping status: Never Used   Substance and Sexual Activity    Alcohol use: No    Drug use: No    Sexual activity: Defer       Family History:  Family History   Problem Relation Age of Onset    No Known Problems Father     No Known Problems Mother        Review of Systems:  Review of Systems   Constitutional:  Negative for fatigue, fever and unexpected weight change.   Respiratory:  Negative for chest tightness and shortness of breath.    Cardiovascular:  Negative for chest pain.   Gastrointestinal:  Negative for abdominal pain, constipation, diarrhea, nausea and vomiting.   Genitourinary:  Negative for difficulty urinating, dysuria, frequency and urgency.        Abnormal curvature of the penis   Skin:   Negative for rash.   Psychiatric/Behavioral:  Negative for confusion and suicidal ideas.        Physical Exam:  Physical Exam  Constitutional:       General: He is not in acute distress.     Appearance: Normal appearance.   HENT:      Head: Normocephalic and atraumatic.      Nose: Nose normal.      Mouth/Throat:      Mouth: Mucous membranes are moist.   Eyes:      Conjunctiva/sclera: Conjunctivae normal.   Cardiovascular:      Rate and Rhythm: Normal rate.      Pulses: Normal pulses.   Pulmonary:      Effort: Pulmonary effort is normal.   Abdominal:      Palpations: Abdomen is soft.   Musculoskeletal:         General: Normal range of motion.      Cervical back: Normal range of motion.   Skin:     General: Skin is warm.   Neurological:      General: No focal deficit present.      Mental Status: He is alert and oriented to person, place, and time.   Psychiatric:         Mood and Affect: Mood normal.         Behavior: Behavior normal.         Thought Content: Thought content normal.         Judgment: Judgment normal.         IPSS Questionnaire (AUA-7):  IPSS Questionnaire (AUA-7):                  IPSS Questionnaire (AUA-7):  Over the past month…    1)  Incomplete Emptying  How often have you had a sensation of not emptying your bladder?  0 - Not at all   2)  Frequency  How often have you had to urinate less than every two hours? 0 - Not at all   3)  Intermittency  How often have you found you stopped and started again several times when you urinated?  0 - Not at all   4) Urgency  How often have you found it difficult to postpone urination?  0 - Not at all   5) Weak Stream  How often have you had a weak urinary stream?  0 - Not at all   6) Straining  How often have you had to push or strain to begin urination?  0 - Not at all   7) Nocturia  How many times did you typically get up at night to urinate?  1 - 1 time   Total Score:  1   The International Prostate Symptom Score (IPSS) is used to screen, diagnose, track  symptoms of benign prostatic hyperplasia (BPH).    0-7 pts (Mild Symptoms)  / 8-19 pts (Moderate) / 20-35 (Severe)    Quality of life due to urinary symptoms:  If you were to spend the rest of your life with your urinary condition the way it is now, how would you feel about that? 1-Pleased   Urine Leakage (Incontinence) 0-No Leakage       Recent Image (CT and/or KUB):   CT Abdomen and Pelvis: Results for orders placed during the hospital encounter of 08/18/21    CT Abdomen Pelvis With & Without Contrast    Narrative  EXAM:  CT Abdomen and Pelvis Without and With Intravenous Contrast    EXAM DATE:  8/18/2021 8:30 AM    CLINICAL HISTORY:  Hematuria, unknown cause; R31.0-Gross hematuria; N30.01-Acute cystitis  with hematuria    TECHNIQUE:  Axial computed tomography images of the abdomen and pelvis without and  with intravenous contrast.  Sagittal and coronal reformatted images were  created and reviewed.  This CT exam was performed using one or more of  the following dose reduction techniques:  automated exposure control,  adjustment of the mA and/or kV according to patient size, and/or use of  iterative reconstruction technique.    COMPARISON:  No relevant prior studies available.    FINDINGS:  LUNG BASES:  Unremarkable.  No mass.  No consolidation.    ABDOMEN:  LIVER:  Unremarkable.  No mass.  GALLBLADDER AND BILE DUCTS:  Unremarkable.  No calcified stones.  No  ductal dilation.  PANCREAS:  Unremarkable.  No mass.  No ductal dilation.  SPLEEN:  Unremarkable.  No splenomegaly.  ADRENALS:  Unremarkable.  No mass.  KIDNEYS AND URETERS:  Right renal cyst measuring 1.7 cm.  No  obstructing stones.  No hydronephrosis.  STOMACH AND BOWEL:  Unremarkable.  No obstruction.  No mucosal  thickening.    PELVIS:  APPENDIX:  No findings to suggest acute appendicitis.  BLADDER:  Urinary bladder wall thickening that could be due to outlet  obstruction.  Left urinary bladder posterior wall diverticulum measuring  up to 3 cm.  No  stones.  REPRODUCTIVE:  Mild prostate enlargement with prostate calcifications  noted.    ABDOMEN and PELVIS:  INTRAPERITONEAL SPACE:  Unremarkable.  No free air.  No significant  fluid collection.  BONES/JOINTS:  No acute fracture.  No dislocation.  SOFT TISSUES:  Unremarkable.  VASCULATURE:  Atherosclerotic vascular calcification.  No abdominal  aortic aneurysm.  LYMPH NODES:  Unremarkable.  No enlarged lymph nodes.    Impression  1.  Urinary bladder wall thickening that could be due to outlet  obstruction.  2.  Left urinary bladder posterior wall diverticulum measuring up to 3  cm.  3.  Mild prostate enlargement with prostate calcifications noted.    This report was finalized on 8/18/2021 9:17 AM by Dr. Nigel Sun MD.     CT Stone Protocol: No results found for this or any previous visit.     KUB: No results found for this or any previous visit.       Labs:  Brief Urine Lab Results  (Last result in the past 365 days)        Color   Clarity   Blood   Leuk Est   Nitrite   Protein   CREAT   Urine HCG        03/20/24 1334 Yellow   Clear   3+   Small (1+)   Negative   Negative                 No visits with results within 3 Month(s) from this visit.   Latest known visit with results is:   Office Visit on 03/20/2024   Component Date Value Ref Range Status    Color 03/20/2024 Yellow  Yellow, Straw, Dark Yellow, Sarah Final    Clarity, UA 03/20/2024 Clear  Clear Final    Specific Gravity  03/20/2024 1.010  1.005 - 1.030 Final    pH, Urine 03/20/2024 6.5  5.0 - 8.0 Final    Leukocytes 03/20/2024 Small (1+) (A)  Negative Final    Nitrite, UA 03/20/2024 Negative  Negative Final    Protein, POC 03/20/2024 Negative  Negative mg/dL Final    Glucose, UA 03/20/2024 Negative  Negative mg/dL Final    Ketones, UA 03/20/2024 Negative  Negative Final    Urobilinogen, UA 03/20/2024 Normal  Normal, 0.2 E.U./dL Final    Bilirubin 03/20/2024 Negative  Negative Final    Blood, UA 03/20/2024 3+ (A)  Negative Final    Lot Number  03/20/2024 [id number]   Final    Expiration Date 03/20/2024 10/25/24   Final    Urine Culture 03/20/2024 >100,000 CFU/mL Staphylococcus, coagulase negative (A)   Final    Call if further workup needed.      PSA 03/20/2024 1.610  0.000 - 4.000 ng/mL Final        Procedure: None  Procedures     Assessment/Plan:   Problem List Items Addressed This Visit       Chronic gout of multiple sites     Other Visit Diagnoses         Benign prostatic hyperplasia without lower urinary tract symptoms    -  Primary    Relevant Orders    PSA Diagnostic            Health Maintenance:   Health Maintenance Due   Topic Date Due    Pneumococcal Vaccine 50+ (1 of 1 - PCV) Never done    ZOSTER VACCINE (1 of 2) Never done    HEPATITIS C SCREENING  Never done    ANNUAL WELLNESS VISIT  Never done    LIPID PANEL  11/01/2020    BMI FOLLOWUP  05/03/2022    INFLUENZA VACCINE  07/01/2024    COVID-19 Vaccine (4 - 2024-25 season) 09/01/2024        Smoking Counseling: Former smoker.  Never used smokeless tobacco.    Urine Incontinence: Patient reports that he is not currently experiencing any symptoms of urinary incontinence.    Patient was given instructions and counseling regarding his condition or for health maintenance advice. Please see specific information pulled into the AVS if appropriate.    Patient Education:   BPH -patient is doing well at this time with no significant lower urinary tract symptoms.  Will repeat a PSA and call him with results once available.  Peyronie's disease -patient does have abnormal curvature but is not wish to proceed forward with any treatment methods.  He is pleased with overall symptoms we will have him follow-up in 1 year or sooner if needed.  Vies him return to the clinic if he has any changes.  He verbalized understanding.    Visit Diagnoses:    ICD-10-CM ICD-9-CM   1. Benign prostatic hyperplasia without lower urinary tract symptoms  N40.0 600.00   2. Chronic gout of multiple sites, unspecified cause   M1A.09X0 274.02     A total of 15 minutes were spent coordinating this patient’s care in clinic today; 10 minutes of which were face-to-face with the patient, reviewing medical history and counseling on the current treatment and followup plan.  All questions were answered to patient's satisfaction.    Meds Ordered During Visit:  No orders of the defined types were placed in this encounter.      Follow Up Appointment: 1 year  No follow-ups on file.      This document has been electronically signed by Ciro Marquis PA-C   March 20, 2025 12:03 EDT    Part of this note may be an electronic transcription/translation of spoken language to printed text using the Dragon Dictation System.

## 2025-04-03 ENCOUNTER — LAB (OUTPATIENT)
Dept: LAB | Facility: HOSPITAL | Age: 75
End: 2025-04-03
Payer: MEDICARE

## 2025-04-03 DIAGNOSIS — N40.0 BENIGN PROSTATIC HYPERPLASIA WITHOUT LOWER URINARY TRACT SYMPTOMS: ICD-10-CM

## 2025-04-03 PROCEDURE — 84550 ASSAY OF BLOOD/URIC ACID: CPT | Performed by: INTERNAL MEDICINE

## 2025-04-03 PROCEDURE — 85025 COMPLETE CBC W/AUTO DIFF WBC: CPT | Performed by: INTERNAL MEDICINE

## 2025-04-03 PROCEDURE — 84153 ASSAY OF PSA TOTAL: CPT

## 2025-04-03 PROCEDURE — 80053 COMPREHEN METABOLIC PANEL: CPT | Performed by: INTERNAL MEDICINE

## 2025-04-03 PROCEDURE — 85652 RBC SED RATE AUTOMATED: CPT | Performed by: INTERNAL MEDICINE

## 2025-04-04 ENCOUNTER — RESULTS FOLLOW-UP (OUTPATIENT)
Dept: UROLOGY | Facility: CLINIC | Age: 75
End: 2025-04-04
Payer: MEDICARE

## 2025-04-04 LAB
ALBUMIN SERPL-MCNC: 3.9 G/DL (ref 3.5–5.2)
ALBUMIN/GLOB SERPL: 1.3 G/DL
ALP SERPL-CCNC: 116 U/L (ref 39–117)
ALT SERPL W P-5'-P-CCNC: 13 U/L (ref 1–41)
ANION GAP SERPL CALCULATED.3IONS-SCNC: 8.8 MMOL/L (ref 5–15)
AST SERPL-CCNC: 25 U/L (ref 1–40)
BASOPHILS # BLD AUTO: 0.02 10*3/MM3 (ref 0–0.2)
BASOPHILS NFR BLD AUTO: 0.5 % (ref 0–1.5)
BILIRUB SERPL-MCNC: 0.5 MG/DL (ref 0–1.2)
BUN SERPL-MCNC: 21 MG/DL (ref 8–23)
BUN/CREAT SERPL: 14.5 (ref 7–25)
CALCIUM SPEC-SCNC: 9.8 MG/DL (ref 8.6–10.5)
CHLORIDE SERPL-SCNC: 100 MMOL/L (ref 98–107)
CO2 SERPL-SCNC: 27.2 MMOL/L (ref 22–29)
CREAT SERPL-MCNC: 1.45 MG/DL (ref 0.76–1.27)
DEPRECATED RDW RBC AUTO: 41.5 FL (ref 37–54)
EGFRCR SERPLBLD CKD-EPI 2021: 51.2 ML/MIN/1.73
EOSINOPHIL # BLD AUTO: 0.31 10*3/MM3 (ref 0–0.4)
EOSINOPHIL NFR BLD AUTO: 8.2 % (ref 0.3–6.2)
ERYTHROCYTE [DISTWIDTH] IN BLOOD BY AUTOMATED COUNT: 12.5 % (ref 12.3–15.4)
ERYTHROCYTE [SEDIMENTATION RATE] IN BLOOD: 22 MM/HR (ref 0–20)
GLOBULIN UR ELPH-MCNC: 3.1 GM/DL
GLUCOSE SERPL-MCNC: 76 MG/DL (ref 65–99)
HCT VFR BLD AUTO: 32.7 % (ref 37.5–51)
HGB BLD-MCNC: 11.1 G/DL (ref 13–17.7)
IMM GRANULOCYTES # BLD AUTO: 0.02 10*3/MM3 (ref 0–0.05)
IMM GRANULOCYTES NFR BLD AUTO: 0.5 % (ref 0–0.5)
LYMPHOCYTES # BLD AUTO: 0.95 10*3/MM3 (ref 0.7–3.1)
LYMPHOCYTES NFR BLD AUTO: 25.1 % (ref 19.6–45.3)
MCH RBC QN AUTO: 30.8 PG (ref 26.6–33)
MCHC RBC AUTO-ENTMCNC: 33.9 G/DL (ref 31.5–35.7)
MCV RBC AUTO: 90.8 FL (ref 79–97)
MONOCYTES # BLD AUTO: 0.5 10*3/MM3 (ref 0.1–0.9)
MONOCYTES NFR BLD AUTO: 13.2 % (ref 5–12)
NEUTROPHILS NFR BLD AUTO: 1.99 10*3/MM3 (ref 1.7–7)
NEUTROPHILS NFR BLD AUTO: 52.5 % (ref 42.7–76)
NRBC BLD AUTO-RTO: 0 /100 WBC (ref 0–0.2)
PLATELET # BLD AUTO: 170 10*3/MM3 (ref 140–450)
PMV BLD AUTO: 10.7 FL (ref 6–12)
POTASSIUM SERPL-SCNC: 4.5 MMOL/L (ref 3.5–5.2)
PROT SERPL-MCNC: 7 G/DL (ref 6–8.5)
PSA SERPL-MCNC: 1.05 NG/ML (ref 0–4)
RBC # BLD AUTO: 3.6 10*6/MM3 (ref 4.14–5.8)
SODIUM SERPL-SCNC: 136 MMOL/L (ref 136–145)
URATE SERPL-MCNC: 5.4 MG/DL (ref 3.4–7)
WBC NRBC COR # BLD AUTO: 3.79 10*3/MM3 (ref 3.4–10.8)

## 2025-04-04 NOTE — TELEPHONE ENCOUNTER
Attempted to call patient with PSA results however he did not answer.  Left voicemail advising PSA remained stable and recommended keep yearly follow-up.

## (undated) DEVICE — Device: Brand: DEFENDO AIR/WATER/SUCTION AND BIOPSY VALVE

## (undated) DEVICE — SYR LUERLOK 30CC

## (undated) DEVICE — CATH FOL COUNCL 2WY 20F 5CC

## (undated) DEVICE — SNAR POLYP CAPTIFLX MICRO OVL 13MM 240CM

## (undated) DEVICE — GOWN,REINF,POLY,ECL,PP SLV,XL: Brand: MEDLINE

## (undated) DEVICE — SINGLE PORT MANIFOLD: Brand: NEPTUNE 2

## (undated) DEVICE — DRAINBAG,ANTI-REFLUX TOWER,L/F,2000ML,LL: Brand: MEDLINE

## (undated) DEVICE — COR CYSTO: Brand: MEDLINE INDUSTRIES, INC.

## (undated) DEVICE — CONN Y IRR DISP 1P/U

## (undated) DEVICE — GLW STD STR 3CM .035X150CM

## (undated) DEVICE — Device

## (undated) DEVICE — Device: Brand: ENDOGATOR

## (undated) DEVICE — GLV SURG PREMIERPRO MIC LTX PF SZ8 BRN

## (undated) DEVICE — TUBING, SUCTION, 1/4" X 20', STRAIGHT: Brand: MEDLINE INDUSTRIES, INC.

## (undated) DEVICE — THE SINGLE USE ETRAP – POLYP TRAP IS USED FOR SUCTION RETRIEVAL OF ENDOSCOPICALLY REMOVED POLYPS.: Brand: ETRAP